# Patient Record
Sex: FEMALE | Race: OTHER | NOT HISPANIC OR LATINO | URBAN - METROPOLITAN AREA
[De-identification: names, ages, dates, MRNs, and addresses within clinical notes are randomized per-mention and may not be internally consistent; named-entity substitution may affect disease eponyms.]

---

## 2021-01-01 ENCOUNTER — OUTPATIENT (OUTPATIENT)
Dept: OUTPATIENT SERVICES | Facility: HOSPITAL | Age: 56
LOS: 1 days | End: 2021-01-01
Payer: MEDICAID

## 2021-01-07 ENCOUNTER — EMERGENCY (EMERGENCY)
Facility: HOSPITAL | Age: 56
LOS: 1 days | Discharge: HOME | End: 2021-01-07
Attending: EMERGENCY MEDICINE | Admitting: EMERGENCY MEDICINE
Payer: MEDICAID

## 2021-01-07 VITALS
OXYGEN SATURATION: 98 % | SYSTOLIC BLOOD PRESSURE: 119 MMHG | RESPIRATION RATE: 16 BRPM | TEMPERATURE: 97 F | DIASTOLIC BLOOD PRESSURE: 78 MMHG | HEART RATE: 75 BPM

## 2021-01-07 VITALS
TEMPERATURE: 98 F | OXYGEN SATURATION: 99 % | DIASTOLIC BLOOD PRESSURE: 79 MMHG | RESPIRATION RATE: 17 BRPM | HEIGHT: 65 IN | SYSTOLIC BLOOD PRESSURE: 149 MMHG | WEIGHT: 187.39 LBS | HEART RATE: 99 BPM

## 2021-01-07 DIAGNOSIS — R51.9 HEADACHE, UNSPECIFIED: ICD-10-CM

## 2021-01-07 DIAGNOSIS — U07.1 COVID-19: ICD-10-CM

## 2021-01-07 DIAGNOSIS — L59.0 ERYTHEMA AB IGNE [DERMATITIS AB IGNE]: ICD-10-CM

## 2021-01-07 DIAGNOSIS — B34.9 VIRAL INFECTION, UNSPECIFIED: ICD-10-CM

## 2021-01-07 LAB
ALBUMIN SERPL ELPH-MCNC: 3.8 G/DL — SIGNIFICANT CHANGE UP (ref 3.4–5)
ALP SERPL-CCNC: 92 U/L — SIGNIFICANT CHANGE UP (ref 40–120)
ALT FLD-CCNC: 29 U/L — SIGNIFICANT CHANGE UP (ref 12–42)
ANION GAP SERPL CALC-SCNC: 6 MMOL/L — LOW (ref 9–16)
APTT BLD: 35.5 SEC — SIGNIFICANT CHANGE UP (ref 27.5–35.5)
AST SERPL-CCNC: 24 U/L — SIGNIFICANT CHANGE UP (ref 15–37)
BASOPHILS # BLD AUTO: 0 K/UL — SIGNIFICANT CHANGE UP (ref 0–0.2)
BASOPHILS NFR BLD AUTO: 0 % — SIGNIFICANT CHANGE UP (ref 0–2)
BILIRUB SERPL-MCNC: 0.5 MG/DL — SIGNIFICANT CHANGE UP (ref 0.2–1.2)
BUN SERPL-MCNC: 13 MG/DL — SIGNIFICANT CHANGE UP (ref 7–23)
CALCIUM SERPL-MCNC: 9.3 MG/DL — SIGNIFICANT CHANGE UP (ref 8.5–10.5)
CHLORIDE SERPL-SCNC: 105 MMOL/L — SIGNIFICANT CHANGE UP (ref 96–108)
CO2 SERPL-SCNC: 28 MMOL/L — SIGNIFICANT CHANGE UP (ref 22–31)
CREAT SERPL-MCNC: 0.83 MG/DL — SIGNIFICANT CHANGE UP (ref 0.5–1.3)
EOSINOPHIL # BLD AUTO: 0.29 K/UL — SIGNIFICANT CHANGE UP (ref 0–0.5)
EOSINOPHIL NFR BLD AUTO: 3 % — SIGNIFICANT CHANGE UP (ref 0–6)
GLUCOSE SERPL-MCNC: 117 MG/DL — HIGH (ref 70–99)
HCT VFR BLD CALC: 35.7 % — SIGNIFICANT CHANGE UP (ref 34.5–45)
HGB BLD-MCNC: 11.7 G/DL — SIGNIFICANT CHANGE UP (ref 11.5–15.5)
INR BLD: 1.1 — SIGNIFICANT CHANGE UP (ref 0.88–1.16)
LYMPHOCYTES # BLD AUTO: 3.33 K/UL — HIGH (ref 1–3.3)
LYMPHOCYTES # BLD AUTO: 35 % — SIGNIFICANT CHANGE UP (ref 13–44)
MANUAL SMEAR VERIFICATION: SIGNIFICANT CHANGE UP
MCHC RBC-ENTMCNC: 27.3 PG — SIGNIFICANT CHANGE UP (ref 27–34)
MCHC RBC-ENTMCNC: 32.8 GM/DL — SIGNIFICANT CHANGE UP (ref 32–36)
MCV RBC AUTO: 83.2 FL — SIGNIFICANT CHANGE UP (ref 80–100)
MONOCYTES # BLD AUTO: 0.67 K/UL — SIGNIFICANT CHANGE UP (ref 0–0.9)
MONOCYTES NFR BLD AUTO: 7 % — SIGNIFICANT CHANGE UP (ref 2–14)
NEUTROPHILS # BLD AUTO: 4.47 K/UL — SIGNIFICANT CHANGE UP (ref 1.8–7.4)
NEUTROPHILS NFR BLD AUTO: 43 % — SIGNIFICANT CHANGE UP (ref 43–77)
NEUTS BAND # BLD: 4 % — SIGNIFICANT CHANGE UP (ref 0–8)
NRBC # BLD: 0 /100 — SIGNIFICANT CHANGE UP (ref 0–0)
NRBC # BLD: SIGNIFICANT CHANGE UP /100 WBCS (ref 0–0)
PLAT MORPH BLD: NORMAL — SIGNIFICANT CHANGE UP
PLATELET # BLD AUTO: 233 K/UL — SIGNIFICANT CHANGE UP (ref 150–400)
POTASSIUM SERPL-MCNC: 3.8 MMOL/L — SIGNIFICANT CHANGE UP (ref 3.5–5.3)
POTASSIUM SERPL-SCNC: 3.8 MMOL/L — SIGNIFICANT CHANGE UP (ref 3.5–5.3)
PROT SERPL-MCNC: 7.5 G/DL — SIGNIFICANT CHANGE UP (ref 6.4–8.2)
PROTHROM AB SERPL-ACNC: 12.9 SEC — SIGNIFICANT CHANGE UP (ref 10.6–13.6)
RBC # BLD: 4.29 M/UL — SIGNIFICANT CHANGE UP (ref 3.8–5.2)
RBC # FLD: 14.5 % — SIGNIFICANT CHANGE UP (ref 10.3–14.5)
RBC BLD AUTO: NORMAL — SIGNIFICANT CHANGE UP
S PYO AG SPEC QL IA: NEGATIVE — SIGNIFICANT CHANGE UP
SODIUM SERPL-SCNC: 139 MMOL/L — SIGNIFICANT CHANGE UP (ref 132–145)
VARIANT LYMPHS # BLD: 8 % — HIGH (ref 0–6)
WBC # BLD: 9.52 K/UL — SIGNIFICANT CHANGE UP (ref 3.8–10.5)
WBC # FLD AUTO: 9.52 K/UL — SIGNIFICANT CHANGE UP (ref 3.8–10.5)

## 2021-01-07 RX ORDER — IBUPROFEN 200 MG
1 TABLET ORAL
Qty: 20 | Refills: 0
Start: 2021-01-07 | End: 2021-01-11

## 2021-01-07 RX ORDER — DEXAMETHASONE 0.5 MG/5ML
6 ELIXIR ORAL ONCE
Refills: 0 | Status: COMPLETED | OUTPATIENT
Start: 2021-01-07 | End: 2021-01-07

## 2021-01-07 RX ORDER — IBUPROFEN 200 MG
800 TABLET ORAL ONCE
Refills: 0 | Status: COMPLETED | OUTPATIENT
Start: 2021-01-07 | End: 2021-01-07

## 2021-01-07 RX ORDER — DEXAMETHASONE 0.5 MG/5ML
1 ELIXIR ORAL
Qty: 10 | Refills: 0
Start: 2021-01-07 | End: 2021-01-16

## 2021-01-07 RX ADMIN — Medication 6 MILLIGRAM(S): at 08:31

## 2021-01-07 RX ADMIN — Medication 800 MILLIGRAM(S): at 08:30

## 2021-01-07 NOTE — ED PROVIDER NOTE - CLINICAL SUMMARY MEDICAL DECISION MAKING FREE TEXT BOX
54 y/o F with PMHx of asthma, HTN, and non-IDDM presenting with intermittent HA and persistent L sided neck pain and sore throat since being diagnosed with COVID-19 on 12/7/20. Exam is remarkable for some L sided cervical lymphadenopathy and some L sided paraspinal muscular tenderness to palpation. Pt otherwise appears well and is without CP or SOB at this time. No conversational dyspnea. Speaking in full sentences. Pt does not appear toxic. No bacterial sores. Suspect symptoms are due to lingering COVID-19. Plan for repeat COVID-19 swab, basic labs, and repeat CXR. Will start on Decadron and Ibuprofen. Will reassess clinically.

## 2021-01-07 NOTE — ED PROVIDER NOTE - PHYSICAL EXAMINATION
VITAL SIGNS: I have reviewed nursing notes and confirm.  CONSTITUTIONAL: Well-developed; well-nourished; in no acute distress.  SKIN: Skin exam is warm and dry, no acute rash.  HEAD: Normocephalic; atraumatic.  EYES: PERRL, EOM intact; conjunctiva and sclera clear.  ENT: No nasal discharge; airway clear; No tonsilar enlargement. No exudates or redness.   NECK: Supple; non tender.  CARD: S1, S2 normal; no murmurs, gallops, or rubs. Regular rate and rhythm.  RESP: Unlabored. No wheezes, rales or rhonchi.  ABD: soft; non-distended; non-tender  MSK: Some left sided paraspinal muscular TTP with no midline or bony tenderness; Normal ROM. No cyanosis or edema. Distal pulses intact  LYMPH: Some L sided cervical  lymphadenopathy  NEURO: Alert, oriented. Grossly unremarkable.  PSYCH: Cooperative, appropriate.

## 2021-01-07 NOTE — ED PROVIDER NOTE - OBJECTIVE STATEMENT
56 y/o F with PMHx of asthma, HTN, and non-IDDM (recently diagnosed with COVID-19 on 12/7/20) presents to the ED for intermittent HA that is mostly behind the L eye and L sided neck pain that is sore to touch. Pt reports the symptoms have been occurring since her Dx of COVID-19 on 12/7/20. She also reports having dry coughs and a sore throat. Pt recently completed a round of Prednisone 1 week ago for an asthma flare-up and a course of Doxycycline for sinusitis (last pill yesterday). Pt denies CP, SOB, numbness, tingling, weakness, GI /  symptoms, and trauma. Pt is mostly complaining of persistent sore throat and L sided neck pain. Pt also endorses she does not typically suffer from HA.

## 2021-01-07 NOTE — ED ADULT TRIAGE NOTE - CHIEF COMPLAINT QUOTE
Generalized neck pain and left eye pain x3 days. PMH HTN, Asthma, HDL, NIDDM. Tested positive for COVID 12/07/20. No fever, chills or SOB.

## 2021-01-07 NOTE — ED PROVIDER NOTE - PATIENT PORTAL LINK FT
You can access the FollowMyHealth Patient Portal offered by [facility] by registering at the following website: [email or web address]. By joining [employer]’s FollowMyHealth portal, you will also be able to view your health information using other applications (apps) compatible with our system.

## 2021-01-07 NOTE — ED PROVIDER NOTE - NSFOLLOWUPINSTRUCTIONS_ED_ALL_ED_FT
Your test results may take 1-3 days. You will get a text/email.  Please check the patient online portal (Dre and website) for results. You can create a portal account at [email or web address] [facility] [place]. If you have old records with [address] or [employer] [facility]  or encounter any difficulties with us you will need to call the HELP line to merge results [phone] (Mon-Fri 8a-5p).    Please follow the instructions on provided coronavirus discharge educational forms and if needed self quarantine for 14 days.     If you test positive for COVID 19:    1. STAY HOME for 14 DAYS  2. Minimize Human contact to ONLY ESSENTIAL  3. Every time you wash your hands, sing the HAPPY BIRTHDAY Song so you know you're washing long enough.  Make sure to scrub the webspace between your fingers.  4. DRINK 1-3 Liters of fluids day x at least 5 days.  To remain hydrated. Your fatigue, lightheadedness, and body aches will decrease and your fever has a better chance of breaking if you are well hydrated.    5. For your Fever and Body aches takes Tylenol 650-100mg every 4-6h (max 4000mg/day). Try not to use ibuprofen, aspirin or naproxen (Advil, Motrin or Aleve) as these may worsen Coronavirus infection.  6. Use an inhaler for mild shortness of breath and cough  7. RETURN TO THE ER IMMEDIATELY IF YOU HAVE WORSENING SHORTNESS OF BREATH  8. TAKE THE FOLLOWING SUPPLEMENTS DAILY.        VITAMIN C 1000MG ONCE DAILY.        VITAMIN D 200IU ONCE DAILY.        ZINC 50MG ONCE DAILY.

## 2021-01-08 LAB — SARS-COV-2 RNA SPEC QL NAA+PROBE: DETECTED

## 2021-01-10 ENCOUNTER — EMERGENCY (EMERGENCY)
Facility: HOSPITAL | Age: 56
LOS: 1 days | Discharge: HOME | End: 2021-01-10
Attending: EMERGENCY MEDICINE | Admitting: EMERGENCY MEDICINE
Payer: MEDICAID

## 2021-01-10 VITALS
TEMPERATURE: 98 F | HEART RATE: 95 BPM | HEIGHT: 65 IN | DIASTOLIC BLOOD PRESSURE: 98 MMHG | OXYGEN SATURATION: 95 % | WEIGHT: 160.06 LBS | RESPIRATION RATE: 18 BRPM | SYSTOLIC BLOOD PRESSURE: 175 MMHG

## 2021-01-10 DIAGNOSIS — R06.02 SHORTNESS OF BREATH: ICD-10-CM

## 2021-01-10 DIAGNOSIS — E11.8 TYPE 2 DIABETES MELLITUS WITH UNSPECIFIED COMPLICATIONS: ICD-10-CM

## 2021-01-10 DIAGNOSIS — I10 ESSENTIAL (PRIMARY) HYPERTENSION: ICD-10-CM

## 2021-01-10 DIAGNOSIS — U07.1 COVID-19: ICD-10-CM

## 2021-01-10 RX ORDER — ALBUTEROL 90 UG/1
2 AEROSOL, METERED ORAL ONCE
Refills: 0 | Status: COMPLETED | OUTPATIENT
Start: 2021-01-10 | End: 2021-01-10

## 2021-01-10 RX ADMIN — ALBUTEROL 2 PUFF(S): 90 AEROSOL, METERED ORAL at 17:04

## 2021-01-10 NOTE — ED PROVIDER NOTE - OBJECTIVE STATEMENT
56 y/o F with PMHx of asthma, HTN, and non-IDDM (recently diagnosed with COVID-19 on 12/7/20), seen 3 days ago in ED for lingering dry cough, HA, L sided MSK neck pain, started on decadron, ibuprofen, continued albuterol use, today BIBEMS for an episode of SOB in which pt woke from a nap and felt as though she couldn't catch her breath. No CP. No n/v. No exertional component. Sx improved by the time EMS arrived, feeling in normal state of health in ED at time of interview. Of note, pt continues to test positive for COVID. No recent fever/chills. HA and neck pain improving. Denies neck stiffness. No focal numbness/tingling/weakness. Admits that she is under a great deal of stress at home and hasn't been sleeping well for several days.

## 2021-01-10 NOTE — ED PROVIDER NOTE - PATIENT PORTAL LINK FT
You can access the FollowMyHealth Patient Portal offered by [facility] by registering at the following website: [email or web address]. By joining [employer]’s FollowMyHealth portal, you will also be able to view your health information using other applications (apps) compatible with our system.

## 2021-01-10 NOTE — ED PROVIDER NOTE - CLINICAL SUMMARY MEDICAL DECISION MAKING FREE TEXT BOX
Feeling well in ED, likely psychosomatic component to discrete episode. Otherwise pt's breathing has been responding well to continued steroid and bronchodilator therapy. Given new inhaler. Ambulated in ED w/no hypoxia or difficulty breathing. d/c home with return precautions and f/u with PMD.

## 2021-01-10 NOTE — ED ADULT TRIAGE NOTE - CHIEF COMPLAINT QUOTE
pt reports running out of her asthma pump. Reports having shortness of breath. Appears in no distress. As per EMS pt walked down 19 floors to get to them. Speaking in full sentences. Has cough. pt reports running out of her asthma pump. Reports having shortness of breath. Appears in no distress. As per EMS pt came down 19 floors to get to them. Speaking in full sentences. Has cough.

## 2021-01-10 NOTE — ED ADULT NURSE NOTE - CHIEF COMPLAINT QUOTE
pt reports running out of her asthma pump. Reports having shortness of breath. Appears in no distress. As per EMS pt came down 19 floors to get to them. Speaking in full sentences. Has cough.

## 2021-01-11 PROBLEM — J45.909 UNSPECIFIED ASTHMA, UNCOMPLICATED: Chronic | Status: ACTIVE | Noted: 2021-01-07

## 2021-01-11 PROBLEM — I10 ESSENTIAL (PRIMARY) HYPERTENSION: Chronic | Status: ACTIVE | Noted: 2021-01-07

## 2021-01-11 PROBLEM — E11.9 TYPE 2 DIABETES MELLITUS WITHOUT COMPLICATIONS: Chronic | Status: ACTIVE | Noted: 2021-01-07

## 2021-01-12 ENCOUNTER — EMERGENCY (EMERGENCY)
Facility: HOSPITAL | Age: 56
LOS: 1 days | Discharge: HOME | End: 2021-01-12
Admitting: EMERGENCY MEDICINE
Payer: MEDICAID

## 2021-01-12 VITALS
TEMPERATURE: 98 F | SYSTOLIC BLOOD PRESSURE: 152 MMHG | RESPIRATION RATE: 18 BRPM | HEART RATE: 86 BPM | OXYGEN SATURATION: 96 % | DIASTOLIC BLOOD PRESSURE: 92 MMHG

## 2021-01-12 VITALS
DIASTOLIC BLOOD PRESSURE: 92 MMHG | TEMPERATURE: 98 F | SYSTOLIC BLOOD PRESSURE: 162 MMHG | HEIGHT: 65 IN | OXYGEN SATURATION: 96 % | RESPIRATION RATE: 18 BRPM | HEART RATE: 84 BPM

## 2021-01-12 DIAGNOSIS — Z98.51 TUBAL LIGATION STATUS: Chronic | ICD-10-CM

## 2021-01-12 DIAGNOSIS — J98.01 ACUTE BRONCHOSPASM: ICD-10-CM

## 2021-01-12 DIAGNOSIS — R06.09 OTHER FORMS OF DYSPNEA: ICD-10-CM

## 2021-01-12 DIAGNOSIS — E87.6 HYPOKALEMIA: ICD-10-CM

## 2021-01-12 LAB
ALBUMIN SERPL ELPH-MCNC: 3.7 G/DL — SIGNIFICANT CHANGE UP (ref 3.4–5)
ALP SERPL-CCNC: 88 U/L — SIGNIFICANT CHANGE UP (ref 40–120)
ALT FLD-CCNC: 107 U/L — HIGH (ref 12–42)
ANION GAP SERPL CALC-SCNC: 11 MMOL/L — SIGNIFICANT CHANGE UP (ref 9–16)
APPEARANCE UR: CLEAR — SIGNIFICANT CHANGE UP
AST SERPL-CCNC: 27 U/L — SIGNIFICANT CHANGE UP (ref 15–37)
BASOPHILS # BLD AUTO: 0 K/UL — SIGNIFICANT CHANGE UP (ref 0–0.2)
BASOPHILS NFR BLD AUTO: 0 % — SIGNIFICANT CHANGE UP (ref 0–2)
BILIRUB SERPL-MCNC: 0.3 MG/DL — SIGNIFICANT CHANGE UP (ref 0.2–1.2)
BILIRUB UR-MCNC: NEGATIVE — SIGNIFICANT CHANGE UP
BUN SERPL-MCNC: 14 MG/DL — SIGNIFICANT CHANGE UP (ref 7–23)
CALCIUM SERPL-MCNC: 8.8 MG/DL — SIGNIFICANT CHANGE UP (ref 8.5–10.5)
CHLORIDE SERPL-SCNC: 103 MMOL/L — SIGNIFICANT CHANGE UP (ref 96–108)
CO2 SERPL-SCNC: 25 MMOL/L — SIGNIFICANT CHANGE UP (ref 22–31)
COLOR SPEC: YELLOW — SIGNIFICANT CHANGE UP
CREAT SERPL-MCNC: 0.89 MG/DL — SIGNIFICANT CHANGE UP (ref 0.5–1.3)
D DIMER BLD IA.RAPID-MCNC: <187 NG/ML DDU — SIGNIFICANT CHANGE UP
DIFF PNL FLD: NEGATIVE — SIGNIFICANT CHANGE UP
EOSINOPHIL # BLD AUTO: 0 K/UL — SIGNIFICANT CHANGE UP (ref 0–0.5)
EOSINOPHIL NFR BLD AUTO: 0 % — SIGNIFICANT CHANGE UP (ref 0–6)
GLUCOSE SERPL-MCNC: 119 MG/DL — HIGH (ref 70–99)
GLUCOSE UR QL: NEGATIVE — SIGNIFICANT CHANGE UP
HCG UR QL: NEGATIVE — SIGNIFICANT CHANGE UP
HCT VFR BLD CALC: 37.1 % — SIGNIFICANT CHANGE UP (ref 34.5–45)
HGB BLD-MCNC: 12.1 G/DL — SIGNIFICANT CHANGE UP (ref 11.5–15.5)
KETONES UR-MCNC: NEGATIVE — SIGNIFICANT CHANGE UP
LEUKOCYTE ESTERASE UR-ACNC: ABNORMAL
LYMPHOCYTES # BLD AUTO: 35 % — SIGNIFICANT CHANGE UP (ref 13–44)
LYMPHOCYTES # BLD AUTO: 5.45 K/UL — HIGH (ref 1–3.3)
MAGNESIUM SERPL-MCNC: 1.8 MG/DL — SIGNIFICANT CHANGE UP (ref 1.6–2.6)
MCHC RBC-ENTMCNC: 27.4 PG — SIGNIFICANT CHANGE UP (ref 27–34)
MCHC RBC-ENTMCNC: 32.6 GM/DL — SIGNIFICANT CHANGE UP (ref 32–36)
MCV RBC AUTO: 83.9 FL — SIGNIFICANT CHANGE UP (ref 80–100)
MONOCYTES # BLD AUTO: 0.93 K/UL — HIGH (ref 0–0.9)
MONOCYTES NFR BLD AUTO: 6 % — SIGNIFICANT CHANGE UP (ref 2–14)
NEUTROPHILS # BLD AUTO: 8.88 K/UL — HIGH (ref 1.8–7.4)
NEUTROPHILS NFR BLD AUTO: 57 % — SIGNIFICANT CHANGE UP (ref 43–77)
NITRITE UR-MCNC: NEGATIVE — SIGNIFICANT CHANGE UP
NRBC # BLD: SIGNIFICANT CHANGE UP /100 WBCS (ref 0–0)
NT-PROBNP SERPL-SCNC: 48 PG/ML — SIGNIFICANT CHANGE UP
PCO2 BLDV: 37 MMHG — LOW (ref 41–51)
PH BLDV: 7.42 — SIGNIFICANT CHANGE UP (ref 7.32–7.43)
PH UR: 6.5 — SIGNIFICANT CHANGE UP (ref 5–8)
PLATELET # BLD AUTO: 246 K/UL — SIGNIFICANT CHANGE UP (ref 150–400)
PO2 BLDV: 90 MMHG — HIGH (ref 35–40)
POTASSIUM SERPL-MCNC: 3.4 MMOL/L — LOW (ref 3.5–5.3)
POTASSIUM SERPL-SCNC: 3.4 MMOL/L — LOW (ref 3.5–5.3)
PROT SERPL-MCNC: 7.1 G/DL — SIGNIFICANT CHANGE UP (ref 6.4–8.2)
PROT UR-MCNC: NEGATIVE MG/DL — SIGNIFICANT CHANGE UP
RBC # BLD: 4.42 M/UL — SIGNIFICANT CHANGE UP (ref 3.8–5.2)
RBC # FLD: 14.8 % — HIGH (ref 10.3–14.5)
SAO2 % BLDV: 97 % — SIGNIFICANT CHANGE UP
SODIUM SERPL-SCNC: 139 MMOL/L — SIGNIFICANT CHANGE UP (ref 132–145)
SP GR SPEC: <=1.005 — SIGNIFICANT CHANGE UP (ref 1–1.03)
TROPONIN I SERPL-MCNC: <0.017 NG/ML — LOW (ref 0.02–0.06)
UROBILINOGEN FLD QL: 0.2 E.U./DL — SIGNIFICANT CHANGE UP
WBC # BLD: 15.58 K/UL — HIGH (ref 3.8–10.5)
WBC # FLD AUTO: 15.58 K/UL — HIGH (ref 3.8–10.5)

## 2021-01-12 RX ORDER — POTASSIUM CHLORIDE 20 MEQ
40 PACKET (EA) ORAL ONCE
Refills: 0 | Status: COMPLETED | OUTPATIENT
Start: 2021-01-12 | End: 2021-01-12

## 2021-01-12 RX ADMIN — Medication 500 MILLIGRAM(S): at 23:43

## 2021-01-12 RX ADMIN — Medication 40 MILLIEQUIVALENT(S): at 23:46

## 2021-01-12 NOTE — ED PROVIDER NOTE - CARE PROVIDER_API CALL
Tomás Mojica)  Critical Care Medicine; Internal Medicine; Pulmonary Disease  [address] [place] [address]  Phone: [phone]  Fax: [phone]  Follow Up Time:

## 2021-01-12 NOTE — ED ADULT TRIAGE NOTE - CHIEF COMPLAINT QUOTE
Pt BIBA c/o difficulty breathing for 1 wk. PMH of asthma and HTN. Pt taking albuterol daily with no relief. Pt speaking in complete sentences w/o difficulty, SpO2 97% on RA.

## 2021-01-12 NOTE — ED PROVIDER NOTE - CLINICAL SUMMARY MEDICAL DECISION MAKING FREE TEXT BOX
pt with COVID infection approximately 3 wks ago, sx improved but noted lingering sob with increased anxiety attacks with insomnia, AFVSS and non-toxic appearing, labs with mild hypokalemia, s/p oral supplementation, wbc elevated at 15K, with reactive lymphocytes, likely viral vs steroid induced (pt received couple doses of decadron during prior ER visits), no s/s of bacterial infection noted elsewhere, CT chest with no acute intrathoracic pathology, results, ddx, and f/u plans discussed with pt at bedside, d/c'd home to f/u with PMD and pulm, strict return precautions discussed, prompt return to ER for any worsening or new sx, pt verbalized understanding.

## 2021-01-12 NOTE — ED PROVIDER NOTE - CARE PROVIDERS DIRECT ADDRESSES
[email or web address]

## 2021-01-12 NOTE — ED PROVIDER NOTE - PATIENT PORTAL LINK FT
You can access the FollowMyHealth Patient Portal offered by [facility] by registering at the following website: [email or web address]. By joining [employer]’s FollowMyHealth portal, you will also be able to view your health information using other applications (apps) compatible with our system.

## 2021-01-12 NOTE — ED PROVIDER NOTE - CARE PLAN
Principal Discharge DX:	Dyspnea  Secondary Diagnosis:	Bronchospastic airway disease  Secondary Diagnosis:	Hypokalemia

## 2021-01-12 NOTE — ED PROVIDER NOTE - PHYSICAL EXAMINATION
Vital Signs - nursing notes reviewed and confirmed  Gen - WDWN F, NAD, comfortable and non-toxic appearing, speaking in full sentences   Skin - warm, dry, intact  HEENT - AT/NC, PERRL, EOMI, no conjunctival injection, moist oral mucosa, TM intact b/l with good cone of lights, o/p clear with no erythema, edema, or exudate, uvula midline, airway patent, neck supple and NT, FROM, no JVD or carotid bruits b/l, no palpable nodes  CV - S1S2, R/R/R, no m/r/g   Resp - respiration non-labored, CTAB, symmetric bs b/l, no r/r/w, no tripoding or accessory muscle use   GI - NABS, soft, ND, NT, no rebound or guarding, no CVAT b/l   MS - w/w/p, no c/c/e, calves supple and NT, distal pulses symmetric b/l, brisk cap refills, +SILT  Neuro - AxOx3, no focal neuro deficits, CN II-XII grossly intact, cerebellar function intact, ambulatory without gait disturbance

## 2021-01-12 NOTE — ED PROVIDER NOTE - OBJECTIVE STATEMENT
54 yo F with PMHx of asthma, no h/o intubation, baseline peak flow unknown, recently dx with COVID 12/07/20, and seen here twice in the past week for SOB/asthma, s/p albuterol and decadron and d/c'd, NIDDM, HLD, presenting c/o SOB, chest tightness and shaking sensation.  Pt reports having increased "anxiety attacks after dx with covid, waking up in the middle of the night feeling suffocated with subjective sob, chest tightness and palpitations." Was seen by PMD a couple of days ago and given "asthma medication" and no other interventions noted. Reports having similar sensation last night and earlier today as well. Denies fever, chills, wheezing, hemoptysis, CP, orthopnea, peripheral edema, stridors, focal weakness, sore throat, tinnitus, HA, dizziness, N/V/D/C, abdominal pain, change in urinary/bowel function, night sweats, and malaise.

## 2021-01-12 NOTE — ED ADULT NURSE NOTE - NS_SISCREENINGSR_GEN_ALL_ED
Change warfarin dose to 5 mg Saturday and 2.5 mg Mon, Tue, Wed, Thurs, Fri, Sun (20 mg/week). Recheck INR in 10-14 days.     
Negative

## 2021-01-12 NOTE — ED ADULT NURSE NOTE - DOES PATIENT HAVE ADVANCE DIRECTIVE
Spoke with patient on phone. Patient does not have copy of bridge plan instructions with her, patient is at home post procedure on this date. Verbally given patient warfarin and Lovenox instructions as outlined in bridge plan instructions. Patient read back correct dose and directions. Next INR 4/3/18 as scheduled.    No

## 2021-01-13 RX ORDER — LANOLIN ALCOHOL/MO/W.PET/CERES
1 CREAM (GRAM) TOPICAL
Qty: 15 | Refills: 0
Start: 2021-01-13

## 2021-01-13 RX ORDER — ALBUTEROL 90 UG/1
1 AEROSOL, METERED ORAL ONCE
Refills: 0 | Status: COMPLETED | OUTPATIENT
Start: 2021-01-13 | End: 2021-01-13

## 2021-01-13 RX ORDER — MULTIVIT-MIN/FERROUS GLUCONATE 9 MG/15 ML
1 LIQUID (ML) ORAL
Qty: 15 | Refills: 0
Start: 2021-01-13

## 2021-01-13 RX ADMIN — ALBUTEROL 1 PUFF(S): 90 AEROSOL, METERED ORAL at 00:58

## 2021-01-19 ENCOUNTER — EMERGENCY (EMERGENCY)
Facility: HOSPITAL | Age: 56
LOS: 1 days | Discharge: HOME | End: 2021-01-19
Admitting: EMERGENCY MEDICINE
Payer: MEDICAID

## 2021-01-19 VITALS
SYSTOLIC BLOOD PRESSURE: 124 MMHG | TEMPERATURE: 98 F | DIASTOLIC BLOOD PRESSURE: 80 MMHG | WEIGHT: 179.9 LBS | HEART RATE: 86 BPM | OXYGEN SATURATION: 99 % | HEIGHT: 65 IN | RESPIRATION RATE: 18 BRPM

## 2021-01-19 VITALS
OXYGEN SATURATION: 98 % | RESPIRATION RATE: 17 BRPM | DIASTOLIC BLOOD PRESSURE: 75 MMHG | TEMPERATURE: 98 F | SYSTOLIC BLOOD PRESSURE: 126 MMHG | HEART RATE: 70 BPM

## 2021-01-19 DIAGNOSIS — L98.8 OTHER SPECIFIED DISORDERS OF THE SKIN AND SUBCUTANEOUS TISSUE: ICD-10-CM

## 2021-01-19 DIAGNOSIS — U07.1 COVID-19: ICD-10-CM

## 2021-01-19 DIAGNOSIS — Z98.51 TUBAL LIGATION STATUS: Chronic | ICD-10-CM

## 2021-01-19 DIAGNOSIS — R10.13 EPIGASTRIC PAIN: ICD-10-CM

## 2021-01-19 PROBLEM — E78.5 HYPERLIPIDEMIA, UNSPECIFIED: Chronic | Status: ACTIVE | Noted: 2021-01-12

## 2021-01-19 LAB
ALBUMIN SERPL ELPH-MCNC: 3.7 G/DL — SIGNIFICANT CHANGE UP (ref 3.4–5)
ALP SERPL-CCNC: 82 U/L — SIGNIFICANT CHANGE UP (ref 40–120)
ALT FLD-CCNC: 64 U/L — HIGH (ref 12–42)
ANION GAP SERPL CALC-SCNC: 12 MMOL/L — SIGNIFICANT CHANGE UP (ref 9–16)
APPEARANCE UR: CLEAR — SIGNIFICANT CHANGE UP
AST SERPL-CCNC: 39 U/L — HIGH (ref 15–37)
BASOPHILS # BLD AUTO: 0.04 K/UL — SIGNIFICANT CHANGE UP (ref 0–0.2)
BASOPHILS NFR BLD AUTO: 0.5 % — SIGNIFICANT CHANGE UP (ref 0–2)
BILIRUB SERPL-MCNC: 0.3 MG/DL — SIGNIFICANT CHANGE UP (ref 0.2–1.2)
BILIRUB UR-MCNC: NEGATIVE — SIGNIFICANT CHANGE UP
BUN SERPL-MCNC: 8 MG/DL — SIGNIFICANT CHANGE UP (ref 7–23)
CALCIUM SERPL-MCNC: 8.9 MG/DL — SIGNIFICANT CHANGE UP (ref 8.5–10.5)
CHLORIDE SERPL-SCNC: 105 MMOL/L — SIGNIFICANT CHANGE UP (ref 96–108)
CO2 SERPL-SCNC: 22 MMOL/L — SIGNIFICANT CHANGE UP (ref 22–31)
COLOR SPEC: YELLOW — SIGNIFICANT CHANGE UP
CREAT SERPL-MCNC: 0.71 MG/DL — SIGNIFICANT CHANGE UP (ref 0.5–1.3)
DIFF PNL FLD: NEGATIVE — SIGNIFICANT CHANGE UP
EOSINOPHIL # BLD AUTO: 0.06 K/UL — SIGNIFICANT CHANGE UP (ref 0–0.5)
EOSINOPHIL NFR BLD AUTO: 0.7 % — SIGNIFICANT CHANGE UP (ref 0–6)
GLUCOSE SERPL-MCNC: 146 MG/DL — HIGH (ref 70–99)
GLUCOSE UR QL: NEGATIVE — SIGNIFICANT CHANGE UP
HCT VFR BLD CALC: 36.7 % — SIGNIFICANT CHANGE UP (ref 34.5–45)
HGB BLD-MCNC: 11.8 G/DL — SIGNIFICANT CHANGE UP (ref 11.5–15.5)
IMM GRANULOCYTES NFR BLD AUTO: 0.3 % — SIGNIFICANT CHANGE UP (ref 0–1.5)
KETONES UR-MCNC: NEGATIVE — SIGNIFICANT CHANGE UP
LEUKOCYTE ESTERASE UR-ACNC: NEGATIVE — SIGNIFICANT CHANGE UP
LIDOCAIN IGE QN: 108 U/L — SIGNIFICANT CHANGE UP (ref 73–393)
LYMPHOCYTES # BLD AUTO: 2.53 K/UL — SIGNIFICANT CHANGE UP (ref 1–3.3)
LYMPHOCYTES # BLD AUTO: 29.2 % — SIGNIFICANT CHANGE UP (ref 13–44)
MCHC RBC-ENTMCNC: 27.3 PG — SIGNIFICANT CHANGE UP (ref 27–34)
MCHC RBC-ENTMCNC: 32.2 GM/DL — SIGNIFICANT CHANGE UP (ref 32–36)
MCV RBC AUTO: 85 FL — SIGNIFICANT CHANGE UP (ref 80–100)
MONOCYTES # BLD AUTO: 0.66 K/UL — SIGNIFICANT CHANGE UP (ref 0–0.9)
MONOCYTES NFR BLD AUTO: 7.6 % — SIGNIFICANT CHANGE UP (ref 2–14)
NEUTROPHILS # BLD AUTO: 5.34 K/UL — SIGNIFICANT CHANGE UP (ref 1.8–7.4)
NEUTROPHILS NFR BLD AUTO: 61.7 % — SIGNIFICANT CHANGE UP (ref 43–77)
NITRITE UR-MCNC: NEGATIVE — SIGNIFICANT CHANGE UP
NRBC # BLD: 0 /100 WBCS — SIGNIFICANT CHANGE UP (ref 0–0)
PH UR: 6 — SIGNIFICANT CHANGE UP (ref 5–8)
PLATELET # BLD AUTO: 214 K/UL — SIGNIFICANT CHANGE UP (ref 150–400)
POTASSIUM SERPL-MCNC: 3.7 MMOL/L — SIGNIFICANT CHANGE UP (ref 3.5–5.3)
POTASSIUM SERPL-SCNC: 3.7 MMOL/L — SIGNIFICANT CHANGE UP (ref 3.5–5.3)
PROT SERPL-MCNC: 7.2 G/DL — SIGNIFICANT CHANGE UP (ref 6.4–8.2)
PROT UR-MCNC: NEGATIVE MG/DL — SIGNIFICANT CHANGE UP
RBC # BLD: 4.32 M/UL — SIGNIFICANT CHANGE UP (ref 3.8–5.2)
RBC # FLD: 14.7 % — HIGH (ref 10.3–14.5)
SODIUM SERPL-SCNC: 139 MMOL/L — SIGNIFICANT CHANGE UP (ref 132–145)
SP GR SPEC: 1.02 — SIGNIFICANT CHANGE UP (ref 1–1.03)
UROBILINOGEN FLD QL: 0.2 E.U./DL — SIGNIFICANT CHANGE UP
WBC # BLD: 8.66 K/UL — SIGNIFICANT CHANGE UP (ref 3.8–10.5)
WBC # FLD AUTO: 8.66 K/UL — SIGNIFICANT CHANGE UP (ref 3.8–10.5)

## 2021-01-19 RX ORDER — MORPHINE SULFATE 50 MG/1
2 CAPSULE, EXTENDED RELEASE ORAL ONCE
Refills: 0 | Status: DISCONTINUED | OUTPATIENT
Start: 2021-01-19 | End: 2021-01-19

## 2021-01-19 RX ADMIN — Medication 20 MILLIGRAM(S): at 14:38

## 2021-01-19 RX ADMIN — MORPHINE SULFATE 2 MILLIGRAM(S): 50 CAPSULE, EXTENDED RELEASE ORAL at 14:38

## 2021-01-19 NOTE — ED PROVIDER NOTE - PATIENT PORTAL LINK FT
You can access the FollowMyHealth Patient Portal offered by [facility] by registering at the following website: [email or web address]. By joining [employer]’s FollowMyHealth portal, you will also be able to view your health information using other applications (apps) compatible with our system.

## 2021-01-19 NOTE — ED PROVIDER NOTE - PHYSICAL EXAMINATION
CONSTITUTIONAL: Well-appearing; well-nourished; in no apparent distress.   	HEAD: Normocephalic; atraumatic.   	EYES:  conjunctiva and sclera clear  	ENT: normal nose; no rhinorrhea; normal pharynx with no erythema or lesions.   	NECK: Supple; non-tender;   	CARDIOVASCULAR: Normal S1, S2; no murmurs, rubs, or gallops. Regular rate and rhythm.   	RESPIRATORY: Breathing easily; breath sounds clear and equal bilaterally; no wheezes, rhonchi, or rales.  	GI: Soft; non-distended; +mild RUQ and epigastric tenderness. no guarding or rebound.   	EXT: No cyanosis or edema; N/V intact  	SKIN: +cracked skin to tips of fingers and toes, no erythema/drainage or tenderness. good cap refill. no signs of malperfusion or infection, nvi, distal pulses intact.   	NEURO: A & O x 3; face symmetric; grossly unremarkable.   PSYCHOLOGICAL: The patient’s mood and manner are appropriate.

## 2021-01-19 NOTE — ED PROVIDER NOTE - NSFOLLOWUPINSTRUCTIONS_ED_ALL_ED_FT
Please follow up with your doctor specifically GI doctor.     RETURN TO THE EMERGENCY DEPARTMENT FOR WORSENING PAIN, SWELLING, VOMITING, FEVER OR ANY CONCERNS.

## 2021-01-19 NOTE — ED PROVIDER NOTE - OBJECTIVE STATEMENT
54 yo F with PMHx of asthma, no h/o intubation, baseline peak flow unknown, recently dx with COVID 12/07/20, DM, presents c/o cracked dry skin to fingertips and some toes due to cold weather noticed a few days ago. also c/o intermittent upper abdominal pain over the past week, more to epigastric and RUQ, denies alleviating or provoking factors. no relation to food. denies chest pain or dyspnea. no URI symptoms. denies fever/chills/chest pain or dyspnea. on creon for chronic pancreatitis?, under care of GI and has follow up with GI appointment coming up.

## 2021-01-19 NOTE — ED PROVIDER NOTE - CLINICAL SUMMARY MEDICAL DECISION MAKING FREE TEXT BOX
well appearing, NAD, here with abd pain x 1 week, +ttp epigastric/RUQ, will check labs, US, patient declining analgesia, well appearing, NAD. also with dry cracked skin to fingertips and toes due to cold weather, no signs of infection or malperfusion, advised moisturizer. reassess.

## 2021-02-12 DIAGNOSIS — Z71.89 OTHER SPECIFIED COUNSELING: ICD-10-CM

## 2021-02-16 ENCOUNTER — EMERGENCY (EMERGENCY)
Facility: HOSPITAL | Age: 56
LOS: 1 days | Discharge: HOME | End: 2021-02-16
Attending: EMERGENCY MEDICINE | Admitting: EMERGENCY MEDICINE
Payer: MEDICAID

## 2021-02-16 VITALS
HEIGHT: 65 IN | OXYGEN SATURATION: 98 % | DIASTOLIC BLOOD PRESSURE: 81 MMHG | SYSTOLIC BLOOD PRESSURE: 140 MMHG | HEART RATE: 95 BPM | WEIGHT: 199.96 LBS | RESPIRATION RATE: 17 BRPM | TEMPERATURE: 97 F

## 2021-02-16 DIAGNOSIS — R51.9 HEADACHE, UNSPECIFIED: ICD-10-CM

## 2021-02-16 DIAGNOSIS — Z98.51 TUBAL LIGATION STATUS: Chronic | ICD-10-CM

## 2021-02-16 LAB
ALBUMIN SERPL ELPH-MCNC: 3.7 G/DL — SIGNIFICANT CHANGE UP (ref 3.4–5)
ALP SERPL-CCNC: 95 U/L — SIGNIFICANT CHANGE UP (ref 40–120)
ALT FLD-CCNC: 23 U/L — SIGNIFICANT CHANGE UP (ref 12–42)
ANION GAP SERPL CALC-SCNC: 11 MMOL/L — SIGNIFICANT CHANGE UP (ref 9–16)
APTT BLD: 34.9 SEC — SIGNIFICANT CHANGE UP (ref 27.5–35.5)
AST SERPL-CCNC: 20 U/L — SIGNIFICANT CHANGE UP (ref 15–37)
BASOPHILS # BLD AUTO: 0.04 K/UL — SIGNIFICANT CHANGE UP (ref 0–0.2)
BASOPHILS NFR BLD AUTO: 0.4 % — SIGNIFICANT CHANGE UP (ref 0–2)
BILIRUB SERPL-MCNC: 0.2 MG/DL — SIGNIFICANT CHANGE UP (ref 0.2–1.2)
BUN SERPL-MCNC: 15 MG/DL — SIGNIFICANT CHANGE UP (ref 7–23)
CALCIUM SERPL-MCNC: 10.1 MG/DL — SIGNIFICANT CHANGE UP (ref 8.5–10.5)
CHLORIDE SERPL-SCNC: 106 MMOL/L — SIGNIFICANT CHANGE UP (ref 96–108)
CO2 SERPL-SCNC: 25 MMOL/L — SIGNIFICANT CHANGE UP (ref 22–31)
CREAT SERPL-MCNC: 0.85 MG/DL — SIGNIFICANT CHANGE UP (ref 0.5–1.3)
EOSINOPHIL # BLD AUTO: 0.17 K/UL — SIGNIFICANT CHANGE UP (ref 0–0.5)
EOSINOPHIL NFR BLD AUTO: 1.5 % — SIGNIFICANT CHANGE UP (ref 0–6)
GLUCOSE SERPL-MCNC: 116 MG/DL — HIGH (ref 70–99)
HCT VFR BLD CALC: 37.6 % — SIGNIFICANT CHANGE UP (ref 34.5–45)
HGB BLD-MCNC: 12 G/DL — SIGNIFICANT CHANGE UP (ref 11.5–15.5)
IMM GRANULOCYTES NFR BLD AUTO: 0.4 % — SIGNIFICANT CHANGE UP (ref 0–1.5)
INR BLD: 1.04 — SIGNIFICANT CHANGE UP (ref 0.88–1.16)
LYMPHOCYTES # BLD AUTO: 29.7 % — SIGNIFICANT CHANGE UP (ref 13–44)
LYMPHOCYTES # BLD AUTO: 3.29 K/UL — SIGNIFICANT CHANGE UP (ref 1–3.3)
MCHC RBC-ENTMCNC: 27.3 PG — SIGNIFICANT CHANGE UP (ref 27–34)
MCHC RBC-ENTMCNC: 31.9 GM/DL — LOW (ref 32–36)
MCV RBC AUTO: 85.5 FL — SIGNIFICANT CHANGE UP (ref 80–100)
MONOCYTES # BLD AUTO: 0.74 K/UL — SIGNIFICANT CHANGE UP (ref 0–0.9)
MONOCYTES NFR BLD AUTO: 6.7 % — SIGNIFICANT CHANGE UP (ref 2–14)
NEUTROPHILS # BLD AUTO: 6.81 K/UL — SIGNIFICANT CHANGE UP (ref 1.8–7.4)
NEUTROPHILS NFR BLD AUTO: 61.3 % — SIGNIFICANT CHANGE UP (ref 43–77)
NRBC # BLD: 0 /100 WBCS — SIGNIFICANT CHANGE UP (ref 0–0)
PLATELET # BLD AUTO: 251 K/UL — SIGNIFICANT CHANGE UP (ref 150–400)
POTASSIUM SERPL-MCNC: 4 MMOL/L — SIGNIFICANT CHANGE UP (ref 3.5–5.3)
POTASSIUM SERPL-SCNC: 4 MMOL/L — SIGNIFICANT CHANGE UP (ref 3.5–5.3)
PROT SERPL-MCNC: 7.3 G/DL — SIGNIFICANT CHANGE UP (ref 6.4–8.2)
PROTHROM AB SERPL-ACNC: 12.5 SEC — SIGNIFICANT CHANGE UP (ref 10.6–13.6)
RBC # BLD: 4.4 M/UL — SIGNIFICANT CHANGE UP (ref 3.8–5.2)
RBC # FLD: 14.1 % — SIGNIFICANT CHANGE UP (ref 10.3–14.5)
SODIUM SERPL-SCNC: 142 MMOL/L — SIGNIFICANT CHANGE UP (ref 132–145)
WBC # BLD: 11.09 K/UL — HIGH (ref 3.8–10.5)
WBC # FLD AUTO: 11.09 K/UL — HIGH (ref 3.8–10.5)

## 2021-02-16 RX ORDER — METOCLOPRAMIDE HCL 10 MG
10 TABLET ORAL ONCE
Refills: 0 | Status: COMPLETED | OUTPATIENT
Start: 2021-02-16 | End: 2021-02-16

## 2021-02-16 RX ORDER — DEXAMETHASONE 0.5 MG/5ML
6 ELIXIR ORAL ONCE
Refills: 0 | Status: COMPLETED | OUTPATIENT
Start: 2021-02-16 | End: 2021-02-16

## 2021-02-16 RX ADMIN — Medication 10 MILLIGRAM(S): at 13:17

## 2021-02-16 RX ADMIN — Medication 6 MILLIGRAM(S): at 13:16

## 2021-02-16 NOTE — ED PROVIDER NOTE - OBJECTIVE STATEMENT
55 female pt, hx of HLD, DM, intracranial htn, presents w persistent headache for 2 weeks. Pain is occipital at times and frontal at times, described as pressure. Pt went to Neuro 2 weeks ago and no intervention recommended at the time. no fever, no chills, no neck pain, no sob, no chest pain, no abd pain.

## 2021-02-16 NOTE — ED PROVIDER NOTE - PROGRESS NOTE DETAILS
Pt w normal work up today, much improved after meds, spoke to SM to coordinate f/u w neuro and primary care. Will DC w reglan/motrin, encouraged to RTER for any worsening.

## 2021-02-16 NOTE — ED PROVIDER NOTE - CLINICAL SUMMARY MEDICAL DECISION MAKING FREE TEXT BOX
Pt w difuse frontal/occipital headache, similar to prior episodes associated to intracranial hypertension, already had some motrin prior to arrival. Will get basic labs, treat w reglan/decadron, get new imaging. Will reassess. Pt is well appearing and has no focal deficits.

## 2021-02-16 NOTE — ED PROVIDER NOTE - CPE EDP SKIN NORM
Zurdo Dash is a 70 year old male who comes in today for a Blood Pressure check because of medication change.    Vitals as recorded, a regular cuff was used.    Patient is tolerating medications well.  Patient is not monitoring Blood Pressure at home.   Current complaints: none    Per last office visit patient was suppose to stop HCTZ and metoprolol, patient thinks that he may still be taking these and he stopped his Lisinopril instead. Patient will try to check his medication when he gets home. He is unsure if he will be able to tell which pills are which, he uses a pill box.  Patient can be reached today after 3 pm at [phone].    Disposition: results routed to MD/ALTA     normal...

## 2021-02-16 NOTE — ED ADULT NURSE NOTE - CHIEF COMPLAINT
Pharmacy contacting MD for refill of OCPs.  Last seen for physical on 9/28/20, last refill 8/18/20.  Routed to Dr. Durand.    The patient is a 55y Female complaining of headache.

## 2021-02-16 NOTE — ED PROVIDER NOTE - PATIENT PORTAL LINK FT
You can access the FollowMyHealth Patient Portal offered by [facility] by registering at the following website: [email or web address]. By joining [employer]’s FollowMyHealth portal, you will also be able to view your health information using other applications (apps) compatible with our system.

## 2021-02-18 ENCOUNTER — APPOINTMENT (OUTPATIENT)
Dept: INTERNAL MEDICINE | Facility: CLINIC | Age: 56
End: 2021-02-18

## 2021-03-11 ENCOUNTER — APPOINTMENT (OUTPATIENT)
Dept: NEUROLOGY | Facility: CLINIC | Age: 56
End: 2021-03-11
Payer: MEDICAID

## 2021-03-11 VITALS
DIASTOLIC BLOOD PRESSURE: 90 MMHG | SYSTOLIC BLOOD PRESSURE: 147 MMHG | HEIGHT: 66 IN | BODY MASS INDEX: 32.14 KG/M2 | WEIGHT: 200 LBS | TEMPERATURE: 97.8 F | OXYGEN SATURATION: 98 % | HEART RATE: 76 BPM

## 2021-03-22 ENCOUNTER — NON-APPOINTMENT (OUTPATIENT)
Age: 56
End: 2021-03-22

## 2021-03-22 ENCOUNTER — APPOINTMENT (OUTPATIENT)
Dept: OPHTHALMOLOGY | Facility: CLINIC | Age: 56
End: 2021-03-22
Payer: MEDICAID

## 2021-03-25 PROBLEM — Z12.11 COLON CANCER SCREENING: Status: ACTIVE | Noted: 2021-03-25

## 2021-03-25 PROBLEM — Z91.89 AT RISK FOR OSTEOPOROSIS: Status: ACTIVE | Noted: 2021-03-25

## 2021-03-25 PROBLEM — Z13.220 LIPID SCREENING: Status: ACTIVE | Noted: 2021-03-25

## 2021-03-25 PROBLEM — Z12.39 BREAST CANCER SCREENING: Status: ACTIVE | Noted: 2021-03-25

## 2021-03-26 ENCOUNTER — APPOINTMENT (OUTPATIENT)
Dept: INTERNAL MEDICINE | Facility: CLINIC | Age: 56
End: 2021-03-26
Payer: MEDICAID

## 2021-03-26 DIAGNOSIS — Z12.11 ENCOUNTER FOR SCREENING FOR MALIGNANT NEOPLASM OF COLON: ICD-10-CM

## 2021-03-26 DIAGNOSIS — Z91.89 OTHER SPECIFIED PERSONAL RISK FACTORS, NOT ELSEWHERE CLASSIFIED: ICD-10-CM

## 2021-03-26 DIAGNOSIS — Z13.220 ENCOUNTER FOR SCREENING FOR LIPOID DISORDERS: ICD-10-CM

## 2021-03-26 DIAGNOSIS — Z12.39 ENCOUNTER FOR OTHER SCREENING FOR MALIGNANT NEOPLASM OF BREAST: ICD-10-CM

## 2021-04-05 ENCOUNTER — EMERGENCY (EMERGENCY)
Facility: HOSPITAL | Age: 56
LOS: 1 days | Discharge: HOME | End: 2021-04-05
Attending: EMERGENCY MEDICINE | Admitting: EMERGENCY MEDICINE
Payer: MEDICAID

## 2021-04-05 VITALS
OXYGEN SATURATION: 98 % | TEMPERATURE: 98 F | WEIGHT: 199.96 LBS | DIASTOLIC BLOOD PRESSURE: 79 MMHG | RESPIRATION RATE: 16 BRPM | HEART RATE: 95 BPM | SYSTOLIC BLOOD PRESSURE: 136 MMHG | HEIGHT: 65 IN

## 2021-04-05 DIAGNOSIS — Z98.51 TUBAL LIGATION STATUS: Chronic | ICD-10-CM

## 2021-04-05 RX ORDER — GABAPENTIN 400 MG/1
1 CAPSULE ORAL
Qty: 15 | Refills: 0
Start: 2021-04-05 | End: 2021-04-09

## 2021-04-05 RX ORDER — GABAPENTIN 400 MG/1
100 CAPSULE ORAL ONCE
Refills: 0 | Status: COMPLETED | OUTPATIENT
Start: 2021-04-05 | End: 2021-04-05

## 2021-04-05 RX ADMIN — GABAPENTIN 100 MILLIGRAM(S): 400 CAPSULE ORAL at 08:59

## 2021-04-05 NOTE — ED PROVIDER NOTE - NS ED ROS FT
General: denies fever, chills  HENT: denies nasal congestion, rhinorrhea  Eyes: denies visual changes, blurred vision  CV: denies chest pain, palpitations  Resp: denies difficulty breathing, cough  Abdominal: denies nausea, vomiting, diarrhea, abdominal pain  MSK: denies muscle aches, leg swelling, + hand and foot paihn  Neuro: denies headaches, + numbness  Skin: denies rashes, bruises

## 2021-04-05 NOTE — ED PROVIDER NOTE - PATIENT PORTAL LINK FT
You can access the FollowMyHealth Patient Portal offered by [facility] by registering at the following website: [email or web address]. By joining [employer]’s FollowMyHealth portal, you will also be able to view your health information using other applications (apps) compatible with our system.

## 2021-04-05 NOTE — ED PROVIDER NOTE - OBJECTIVE STATEMENT
55M w/ h/o HTN, HLD, DM, arthritis p/w 3 months of chronic bilateral wrist and hand pain. States she started having similar symptoms in the bilateral feet recently. States symptoms started after she had covid. Denies f/c, n/v/d, abd pain, chest pain. States she is on metformin for her DM and it has been well controlled. Denies trauma or other injuries.     used

## 2021-04-05 NOTE — ED PROVIDER NOTE - ATTENDING CONTRIBUTION TO CARE
I have seen the pt, reviewed all pertinent clinical data, and I agree with the documentation/care/plan executed by Dr. Solano. 56 y/o F w/hx DM, HTN, HLD, p/w b/l hand burning for 3 months, now also having similar sx to b/l feet. No trauma. No skin changes. Denies positional aspect to sx. No fever/chills. VSS, neuro/vasc intact on exam w/symmetric appearance of extremities, likely neuropathy from DM, unlikely central cause. trial of gabapentin, f/u with PMD later this week.

## 2021-04-05 NOTE — ED PROVIDER NOTE - PHYSICAL EXAMINATION
CONSTITUTIONAL: NAD, awake, alert  HEAD: Normocephalic; atraumatic  ENMT: External appears normal, MMM  NECK: no tenderness, FROM  CARD: Normal Sl, S2; no audible murmurs  RESP: normal wob, lungs ctab  ABD: soft, non-distended; non-tender  MSK: no edema, normal ROM in all four extremities, pulses intact throughout  SKIN: Warm, dry, L lateral wrist birthmark noted  NEURO: aaox3, moving all extremities spontaneously, + 5/5 strength throughout, sensation decreased through bilateral hands in radial ulnar and median nerve distribution, distal toe and dorsal foot decreased sensation

## 2021-04-05 NOTE — ED PROVIDER NOTE - NSFOLLOWUPINSTRUCTIONS_ED_ALL_ED_FT
Neuropathy    Peripheral neuropathy is a type of nerve damage that affects nerves that carry signals between the spinal cord and other parts of the body. Many things can damage peripheral nerves including vascular disease or diabetes. Signs and symptoms of neuropathy include numbness, tingling, pain, sensitive skin, weakness/paralysis of a body part, muscle twitching, loss of balance, not being able to control your bladder or sexual problems. If you have numbness in your feet, check every day for signs of infection including redness, warmth, and swelling.      SEEK IMMEDIATE MEDICAL CARE IF YOU HAVE ANY OF THE FOLLOWING SYMPTOMS: an injury or infection that is not healing, dizziness, vomiting, paralysis, chest pain, or trouble breathing.    - Follow up with your primary care doctor in 1-2 days.    - Bring results with you to the appointment.   - Return to the ED for new or worsening symptoms.

## 2021-04-08 DIAGNOSIS — Z86.16 PERSONAL HISTORY OF COVID-19: ICD-10-CM

## 2021-04-08 DIAGNOSIS — J45.909 UNSPECIFIED ASTHMA, UNCOMPLICATED: ICD-10-CM

## 2021-04-08 DIAGNOSIS — I10 ESSENTIAL (PRIMARY) HYPERTENSION: ICD-10-CM

## 2021-04-08 DIAGNOSIS — Z79.899 OTHER LONG TERM (CURRENT) DRUG THERAPY: ICD-10-CM

## 2021-04-08 DIAGNOSIS — E78.5 HYPERLIPIDEMIA, UNSPECIFIED: ICD-10-CM

## 2021-04-08 DIAGNOSIS — M25.531 PAIN IN RIGHT WRIST: ICD-10-CM

## 2021-04-08 DIAGNOSIS — E11.40 TYPE 2 DIABETES MELLITUS WITH DIABETIC NEUROPATHY, UNSPECIFIED: ICD-10-CM

## 2021-04-08 DIAGNOSIS — Z79.52 LONG TERM (CURRENT) USE OF SYSTEMIC STEROIDS: ICD-10-CM

## 2021-04-13 ENCOUNTER — APPOINTMENT (OUTPATIENT)
Dept: INTERNAL MEDICINE | Facility: CLINIC | Age: 56
End: 2021-04-13
Payer: MEDICAID

## 2021-04-13 ENCOUNTER — LABORATORY RESULT (OUTPATIENT)
Age: 56
End: 2021-04-13

## 2021-04-13 VITALS
DIASTOLIC BLOOD PRESSURE: 78 MMHG | HEART RATE: 81 BPM | SYSTOLIC BLOOD PRESSURE: 126 MMHG | TEMPERATURE: 98.1 F | BODY MASS INDEX: 31.96 KG/M2 | WEIGHT: 198 LBS | OXYGEN SATURATION: 98 %

## 2021-04-13 DIAGNOSIS — G47.00 INSOMNIA, UNSPECIFIED: ICD-10-CM

## 2021-04-13 DIAGNOSIS — R32 UNSPECIFIED URINARY INCONTINENCE: ICD-10-CM

## 2021-04-13 DIAGNOSIS — G93.2 BENIGN INTRACRANIAL HYPERTENSION: ICD-10-CM

## 2021-04-13 DIAGNOSIS — Z56.0 UNEMPLOYMENT, UNSPECIFIED: ICD-10-CM

## 2021-04-13 DIAGNOSIS — E78.49 OTHER HYPERLIPIDEMIA: ICD-10-CM

## 2021-04-13 DIAGNOSIS — Z78.9 OTHER SPECIFIED HEALTH STATUS: ICD-10-CM

## 2021-04-13 DIAGNOSIS — Z00.00 ENCOUNTER FOR GENERAL ADULT MEDICAL EXAMINATION W/OUT ABNORMAL FINDINGS: ICD-10-CM

## 2021-04-13 RX ORDER — IBUPROFEN 600 MG/1
600 TABLET, FILM COATED ORAL 3 TIMES DAILY
Qty: 30 | Refills: 1 | Status: ACTIVE | COMMUNITY
Start: 2021-04-13 | End: 1900-01-01

## 2021-04-13 RX ORDER — FLUTICASONE PROPIONATE 50 UG/1
50 SPRAY, METERED NASAL
Refills: 0 | Status: ACTIVE | COMMUNITY
Start: 2021-04-13

## 2021-04-13 SDOH — ECONOMIC STABILITY - INCOME SECURITY: UNEMPLOYMENT, UNSPECIFIED: Z56.0

## 2021-04-14 LAB
25(OH)D3 SERPL-MCNC: 38.1 NG/ML
ALBUMIN SERPL ELPH-MCNC: 4.3 G/DL
ALP BLD-CCNC: 112 U/L
ALT SERPL-CCNC: 16 U/L
ANION GAP SERPL CALC-SCNC: 12 MMOL/L
APPEARANCE: ABNORMAL
AST SERPL-CCNC: 19 U/L
BASOPHILS # BLD AUTO: 0.03 K/UL
BASOPHILS NFR BLD AUTO: 0.3 %
BILIRUB SERPL-MCNC: 0.2 MG/DL
BILIRUBIN URINE: NEGATIVE
BLOOD URINE: NEGATIVE
BUN SERPL-MCNC: 11 MG/DL
CALCIUM SERPL-MCNC: 9.9 MG/DL
CHLORIDE SERPL-SCNC: 106 MMOL/L
CHOLEST SERPL-MCNC: 149 MG/DL
CO2 SERPL-SCNC: 23 MMOL/L
COLOR: YELLOW
CREAT SERPL-MCNC: 0.6 MG/DL
EOSINOPHIL # BLD AUTO: 0.14 K/UL
EOSINOPHIL NFR BLD AUTO: 1.5 %
ESTIMATED AVERAGE GLUCOSE: 143 MG/DL
GLUCOSE QUALITATIVE U: NEGATIVE
GLUCOSE SERPL-MCNC: 97 MG/DL
HBA1C MFR BLD HPLC: 6.6 %
HCT VFR BLD CALC: 41.1 %
HDLC SERPL-MCNC: 52 MG/DL
HGB BLD-MCNC: 12.3 G/DL
IMM GRANULOCYTES NFR BLD AUTO: 0.2 %
KETONES URINE: NEGATIVE
LDLC SERPL CALC-MCNC: 71 MG/DL
LEUKOCYTE ESTERASE URINE: NEGATIVE
LYMPHOCYTES # BLD AUTO: 2.78 K/UL
LYMPHOCYTES NFR BLD AUTO: 29.8 %
MAN DIFF?: NORMAL
MCHC RBC-ENTMCNC: 27.8 PG
MCHC RBC-ENTMCNC: 29.9 GM/DL
MCV RBC AUTO: 93 FL
MONOCYTES # BLD AUTO: 0.56 K/UL
MONOCYTES NFR BLD AUTO: 6 %
NEUTROPHILS # BLD AUTO: 5.81 K/UL
NEUTROPHILS NFR BLD AUTO: 62.2 %
NITRITE URINE: NEGATIVE
NONHDLC SERPL-MCNC: 97 MG/DL
PH URINE: 7.5
PLATELET # BLD AUTO: 253 K/UL
POTASSIUM SERPL-SCNC: 4.3 MMOL/L
PROT SERPL-MCNC: 6.7 G/DL
PROTEIN URINE: NEGATIVE
RBC # BLD: 4.42 M/UL
RBC # FLD: 13.7 %
SODIUM SERPL-SCNC: 141 MMOL/L
SPECIFIC GRAVITY URINE: 1.01
TRIGL SERPL-MCNC: 132 MG/DL
UROBILINOGEN URINE: NORMAL
WBC # FLD AUTO: 9.34 K/UL

## 2021-04-15 ENCOUNTER — APPOINTMENT (OUTPATIENT)
Dept: NEUROLOGY | Facility: CLINIC | Age: 56
End: 2021-04-15
Payer: MEDICAID

## 2021-04-15 VITALS
DIASTOLIC BLOOD PRESSURE: 76 MMHG | OXYGEN SATURATION: 98 % | HEART RATE: 86 BPM | BODY MASS INDEX: 31.82 KG/M2 | TEMPERATURE: 97.6 F | SYSTOLIC BLOOD PRESSURE: 121 MMHG | WEIGHT: 198 LBS | HEIGHT: 66 IN

## 2021-04-22 ENCOUNTER — EMERGENCY (EMERGENCY)
Facility: HOSPITAL | Age: 56
LOS: 1 days | Discharge: HOME | End: 2021-04-22
Admitting: EMERGENCY MEDICINE
Payer: MEDICAID

## 2021-04-22 VITALS
WEIGHT: 190.04 LBS | TEMPERATURE: 98 F | HEIGHT: 65 IN | DIASTOLIC BLOOD PRESSURE: 82 MMHG | SYSTOLIC BLOOD PRESSURE: 144 MMHG | OXYGEN SATURATION: 96 % | RESPIRATION RATE: 17 BRPM | HEART RATE: 98 BPM

## 2021-04-22 VITALS
OXYGEN SATURATION: 99 % | DIASTOLIC BLOOD PRESSURE: 72 MMHG | RESPIRATION RATE: 16 BRPM | SYSTOLIC BLOOD PRESSURE: 116 MMHG | HEART RATE: 74 BPM | TEMPERATURE: 98 F

## 2021-04-22 DIAGNOSIS — E11.9 TYPE 2 DIABETES MELLITUS WITHOUT COMPLICATIONS: ICD-10-CM

## 2021-04-22 DIAGNOSIS — J45.909 UNSPECIFIED ASTHMA, UNCOMPLICATED: ICD-10-CM

## 2021-04-22 DIAGNOSIS — Z98.51 TUBAL LIGATION STATUS: Chronic | ICD-10-CM

## 2021-04-22 DIAGNOSIS — E78.5 HYPERLIPIDEMIA, UNSPECIFIED: ICD-10-CM

## 2021-04-22 DIAGNOSIS — I10 ESSENTIAL (PRIMARY) HYPERTENSION: ICD-10-CM

## 2021-04-22 DIAGNOSIS — K29.70 GASTRITIS, UNSPECIFIED, WITHOUT BLEEDING: ICD-10-CM

## 2021-04-22 DIAGNOSIS — R10.13 EPIGASTRIC PAIN: ICD-10-CM

## 2021-04-22 LAB
ALBUMIN SERPL ELPH-MCNC: 3.4 G/DL — SIGNIFICANT CHANGE UP (ref 3.4–5)
ALP SERPL-CCNC: 109 U/L — SIGNIFICANT CHANGE UP (ref 40–120)
ALT FLD-CCNC: 19 U/L — SIGNIFICANT CHANGE UP (ref 12–42)
ANION GAP SERPL CALC-SCNC: 11 MMOL/L — SIGNIFICANT CHANGE UP (ref 9–16)
APPEARANCE UR: CLEAR — SIGNIFICANT CHANGE UP
APTT BLD: 34.7 SEC — SIGNIFICANT CHANGE UP (ref 27.5–35.5)
AST SERPL-CCNC: 17 U/L — SIGNIFICANT CHANGE UP (ref 15–37)
BASOPHILS # BLD AUTO: 0.04 K/UL — SIGNIFICANT CHANGE UP (ref 0–0.2)
BASOPHILS NFR BLD AUTO: 0.4 % — SIGNIFICANT CHANGE UP (ref 0–2)
BILIRUB SERPL-MCNC: 0.3 MG/DL — SIGNIFICANT CHANGE UP (ref 0.2–1.2)
BILIRUB UR-MCNC: NEGATIVE — SIGNIFICANT CHANGE UP
BUN SERPL-MCNC: 16 MG/DL — SIGNIFICANT CHANGE UP (ref 7–23)
CALCIUM SERPL-MCNC: 9.3 MG/DL — SIGNIFICANT CHANGE UP (ref 8.5–10.5)
CHLORIDE SERPL-SCNC: 106 MMOL/L — SIGNIFICANT CHANGE UP (ref 96–108)
CO2 SERPL-SCNC: 25 MMOL/L — SIGNIFICANT CHANGE UP (ref 22–31)
COLOR SPEC: YELLOW — SIGNIFICANT CHANGE UP
CREAT SERPL-MCNC: 0.67 MG/DL — SIGNIFICANT CHANGE UP (ref 0.5–1.3)
DIFF PNL FLD: NEGATIVE — SIGNIFICANT CHANGE UP
EOSINOPHIL # BLD AUTO: 0.28 K/UL — SIGNIFICANT CHANGE UP (ref 0–0.5)
EOSINOPHIL NFR BLD AUTO: 3.1 % — SIGNIFICANT CHANGE UP (ref 0–6)
GLUCOSE SERPL-MCNC: 135 MG/DL — HIGH (ref 70–99)
GLUCOSE UR QL: NEGATIVE — SIGNIFICANT CHANGE UP
HCT VFR BLD CALC: 35.8 % — SIGNIFICANT CHANGE UP (ref 34.5–45)
HGB BLD-MCNC: 11.7 G/DL — SIGNIFICANT CHANGE UP (ref 11.5–15.5)
IMM GRANULOCYTES NFR BLD AUTO: 0.3 % — SIGNIFICANT CHANGE UP (ref 0–1.5)
INR BLD: 1.09 — SIGNIFICANT CHANGE UP (ref 0.88–1.16)
KETONES UR-MCNC: NEGATIVE — SIGNIFICANT CHANGE UP
LEUKOCYTE ESTERASE UR-ACNC: NEGATIVE — SIGNIFICANT CHANGE UP
LIDOCAIN IGE QN: 94 U/L — SIGNIFICANT CHANGE UP (ref 73–393)
LYMPHOCYTES # BLD AUTO: 2.3 K/UL — SIGNIFICANT CHANGE UP (ref 1–3.3)
LYMPHOCYTES # BLD AUTO: 25.7 % — SIGNIFICANT CHANGE UP (ref 13–44)
MCHC RBC-ENTMCNC: 28 PG — SIGNIFICANT CHANGE UP (ref 27–34)
MCHC RBC-ENTMCNC: 32.7 GM/DL — SIGNIFICANT CHANGE UP (ref 32–36)
MCV RBC AUTO: 85.6 FL — SIGNIFICANT CHANGE UP (ref 80–100)
MONOCYTES # BLD AUTO: 0.53 K/UL — SIGNIFICANT CHANGE UP (ref 0–0.9)
MONOCYTES NFR BLD AUTO: 5.9 % — SIGNIFICANT CHANGE UP (ref 2–14)
NEUTROPHILS # BLD AUTO: 5.77 K/UL — SIGNIFICANT CHANGE UP (ref 1.8–7.4)
NEUTROPHILS NFR BLD AUTO: 64.6 % — SIGNIFICANT CHANGE UP (ref 43–77)
NITRITE UR-MCNC: NEGATIVE — SIGNIFICANT CHANGE UP
NRBC # BLD: 0 /100 WBCS — SIGNIFICANT CHANGE UP (ref 0–0)
PH UR: 5.5 — SIGNIFICANT CHANGE UP (ref 5–8)
PLATELET # BLD AUTO: 216 K/UL — SIGNIFICANT CHANGE UP (ref 150–400)
POTASSIUM SERPL-MCNC: 3.8 MMOL/L — SIGNIFICANT CHANGE UP (ref 3.5–5.3)
POTASSIUM SERPL-SCNC: 3.8 MMOL/L — SIGNIFICANT CHANGE UP (ref 3.5–5.3)
PROT SERPL-MCNC: 6.7 G/DL — SIGNIFICANT CHANGE UP (ref 6.4–8.2)
PROT UR-MCNC: NEGATIVE MG/DL — SIGNIFICANT CHANGE UP
PROTHROM AB SERPL-ACNC: 13 SEC — SIGNIFICANT CHANGE UP (ref 10.6–13.6)
RBC # BLD: 4.18 M/UL — SIGNIFICANT CHANGE UP (ref 3.8–5.2)
RBC # FLD: 13.2 % — SIGNIFICANT CHANGE UP (ref 10.3–14.5)
SODIUM SERPL-SCNC: 142 MMOL/L — SIGNIFICANT CHANGE UP (ref 132–145)
SP GR SPEC: 1.02 — SIGNIFICANT CHANGE UP (ref 1–1.03)
TROPONIN I SERPL-MCNC: <0.017 NG/ML — LOW (ref 0.02–0.06)
TROPONIN I SERPL-MCNC: <0.017 NG/ML — LOW (ref 0.02–0.06)
UROBILINOGEN FLD QL: 0.2 E.U./DL — SIGNIFICANT CHANGE UP
WBC # BLD: 8.95 K/UL — SIGNIFICANT CHANGE UP (ref 3.8–10.5)
WBC # FLD AUTO: 8.95 K/UL — SIGNIFICANT CHANGE UP (ref 3.8–10.5)

## 2021-04-22 RX ORDER — ESOMEPRAZOLE MAGNESIUM 40 MG/1
1 CAPSULE, DELAYED RELEASE ORAL
Qty: 30 | Refills: 0
Start: 2021-04-22 | End: 2021-05-21

## 2021-04-22 RX ORDER — ONDANSETRON 8 MG/1
4 TABLET, FILM COATED ORAL ONCE
Refills: 0 | Status: COMPLETED | OUTPATIENT
Start: 2021-04-22 | End: 2021-04-22

## 2021-04-22 RX ORDER — SODIUM CHLORIDE 9 MG/ML
1000 INJECTION INTRAMUSCULAR; INTRAVENOUS; SUBCUTANEOUS ONCE
Refills: 0 | Status: COMPLETED | OUTPATIENT
Start: 2021-04-22 | End: 2021-04-22

## 2021-04-22 RX ORDER — FAMOTIDINE 10 MG/ML
20 INJECTION INTRAVENOUS ONCE
Refills: 0 | Status: COMPLETED | OUTPATIENT
Start: 2021-04-22 | End: 2021-04-22

## 2021-04-22 RX ADMIN — ONDANSETRON 4 MILLIGRAM(S): 8 TABLET, FILM COATED ORAL at 09:18

## 2021-04-22 RX ADMIN — SODIUM CHLORIDE 1000 MILLILITER(S): 9 INJECTION INTRAMUSCULAR; INTRAVENOUS; SUBCUTANEOUS at 09:18

## 2021-04-22 RX ADMIN — SODIUM CHLORIDE 1000 MILLILITER(S): 9 INJECTION INTRAMUSCULAR; INTRAVENOUS; SUBCUTANEOUS at 10:51

## 2021-04-22 RX ADMIN — FAMOTIDINE 20 MILLIGRAM(S): 10 INJECTION INTRAVENOUS at 09:18

## 2021-04-22 NOTE — ED ADULT NURSE NOTE - OBJECTIVE STATEMENT
Pt aox3 with steady gait. Pt states increased upper ab pain radiating to chest x 3 days with increased sob and slight cough. pt denies productive cough or fevers. dry cough worsening at night time. Pt hx asthma, htn and dm. Pt denies n/v but states increased stool. denies diarrhea

## 2021-04-22 NOTE — ED ADULT NURSE NOTE - NSIMPLEMENTINTERV_GEN_ALL_ED
Implemented All Universal Safety Interventions:  [place] to call system. Call bell, personal items and telephone within reach. Instruct patient to call for assistance. Room bathroom lighting operational. Non-slip footwear when patient is off stretcher. Physically safe environment: no spills, clutter or unnecessary equipment. Stretcher in lowest position, wheels locked, appropriate side rails in place.

## 2021-04-22 NOTE — ED PROVIDER NOTE - PATIENT PORTAL LINK FT
You can access the FollowMyHealth Patient Portal offered by [facility] by registering at the following website: [email or web address]. By joining [employer]’s FollowMyHealth portal, you will also be able to view your health information using other applications (apps) compatible with our system.

## 2021-04-22 NOTE — ED ADULT TRIAGE NOTE - CHIEF COMPLAINT QUOTE
walk in c/o generalized abdominal pain, sore throat and heartburn x 2 days.  +nausea +HA. PMH Asthma HTN takes Losartan 100

## 2021-04-22 NOTE — ED PROVIDER NOTE - CARE PROVIDERS DIRECT ADDRESSES
,[email or web address]

## 2021-04-22 NOTE — ED PROVIDER NOTE - OBJECTIVE STATEMENT
54 y/o F with PMHx of DM, HTN, and asthma presents to the ED for 2 days of non-specific mild epigastric pain that radiates somewhat substernally. Pt also reports associated nausea, sore throat, and mild soreness to the lower sternal chest which he feels is GI related. Pt has been tolerating oral/liquid intake. He states he had a prior "pancreas problem" from several years ago which was relieved with Creon but he does not recall the full details. No aggravating or relieving factors. Pt denies CP, SOB, fevers, chills, changes in bowel movements, vomiting, dizziness, syncope, palpitations, coughs, hemoptysis, hematemesis,   hematochezia, melena, dysuria, hematuria, and frequency.

## 2021-04-22 NOTE — ED PROVIDER NOTE - CLINICAL SUMMARY MEDICAL DECISION MAKING FREE TEXT BOX
54 y/o F with Hx of DM, HTN, and asthma presenting with 2 days of epigastric pain and associated nausea. Exam is remarkable for epigastric tenderness to palpation. No other concerning findings. Pt otherwise appears well and in no acute distress. Plan for routine labs, EKG, CXR, IV hydration, and antiemetics. Will reassess clinically after results have been obtained.

## 2021-04-22 NOTE — ED PROVIDER NOTE - CARE PROVIDER_API CALL
<<-----Click here for Discharge Medication Review
Deangelo Mendez)  Gastroenterology; Internal Medicine  [address] [place] [id number]  Phone: [phone]  Fax: [phone]  Follow Up Time: 1-3 Days

## 2021-04-28 ENCOUNTER — OUTPATIENT (OUTPATIENT)
Dept: OUTPATIENT SERVICES | Facility: HOSPITAL | Age: 56
LOS: 1 days | End: 2021-04-28

## 2021-04-28 ENCOUNTER — APPOINTMENT (OUTPATIENT)
Dept: MRI IMAGING | Facility: CLINIC | Age: 56
End: 2021-04-28
Payer: MEDICAID

## 2021-04-28 ENCOUNTER — RESULT REVIEW (OUTPATIENT)
Age: 56
End: 2021-04-28

## 2021-04-28 DIAGNOSIS — Z98.51 TUBAL LIGATION STATUS: Chronic | ICD-10-CM

## 2021-05-07 ENCOUNTER — OUTPATIENT (OUTPATIENT)
Dept: OUTPATIENT SERVICES | Facility: HOSPITAL | Age: 56
LOS: 1 days | End: 2021-05-07
Payer: MEDICAID

## 2021-05-07 ENCOUNTER — APPOINTMENT (OUTPATIENT)
Dept: ORTHOPEDIC SURGERY | Facility: CLINIC | Age: 56
End: 2021-05-07
Payer: MEDICAID

## 2021-05-07 ENCOUNTER — RESULT REVIEW (OUTPATIENT)
Age: 56
End: 2021-05-07

## 2021-05-07 VITALS — RESPIRATION RATE: 16 BRPM | BODY MASS INDEX: 32.14 KG/M2 | HEIGHT: 66 IN | WEIGHT: 200 LBS

## 2021-05-07 DIAGNOSIS — Z86.39 PERSONAL HISTORY OF OTHER ENDOCRINE, NUTRITIONAL AND METABOLIC DISEASE: ICD-10-CM

## 2021-05-07 DIAGNOSIS — R20.0 ANESTHESIA OF SKIN: ICD-10-CM

## 2021-05-07 DIAGNOSIS — Z86.79 PERSONAL HISTORY OF OTHER DISEASES OF THE CIRCULATORY SYSTEM: ICD-10-CM

## 2021-05-07 DIAGNOSIS — Z87.19 PERSONAL HISTORY OF OTHER DISEASES OF THE DIGESTIVE SYSTEM: ICD-10-CM

## 2021-05-07 DIAGNOSIS — Z98.51 TUBAL LIGATION STATUS: Chronic | ICD-10-CM

## 2021-05-11 ENCOUNTER — APPOINTMENT (OUTPATIENT)
Dept: PULMONOLOGY | Facility: CLINIC | Age: 56
End: 2021-05-11
Payer: MEDICAID

## 2021-05-11 VITALS
BODY MASS INDEX: 32.14 KG/M2 | OXYGEN SATURATION: 97 % | HEIGHT: 66 IN | DIASTOLIC BLOOD PRESSURE: 72 MMHG | WEIGHT: 200 LBS | HEART RATE: 95 BPM | TEMPERATURE: 97.2 F | SYSTOLIC BLOOD PRESSURE: 122 MMHG

## 2021-05-11 RX ORDER — ALBUTEROL SULFATE 90 UG/1
108 (90 BASE) INHALANT RESPIRATORY (INHALATION)
Qty: 1 | Refills: 2 | Status: ACTIVE | COMMUNITY
Start: 2021-04-13 | End: 1900-01-01

## 2021-05-11 NOTE — PHYSICAL EXAM
[No Acute Distress] : no acute distress [Normal Oropharynx] : normal oropharynx [Normal Appearance] : normal appearance [Normal Rate/Rhythm] : normal rate/rhythm [Normal S1, S2] : normal s1, s2 [No Resp Distress] : no resp distress [Clear to Auscultation Bilaterally] : clear to auscultation bilaterally [No Abnormalities] : no abnormalities [Benign] : benign [Normal Gait] : normal gait [No Clubbing] : no clubbing [Normal Color/ Pigmentation] : normal color/ pigmentation [No Focal Deficits] : no focal deficits [Oriented x3] : oriented x3 [Normal Affect] : normal affect

## 2021-05-12 NOTE — REASON FOR VISIT
[Consultation] : a consultation [Sleep Apnea] : sleep apnea [Shortness of Breath] : shortness of breath

## 2021-05-12 NOTE — HISTORY OF PRESENT ILLNESS
[Never] : never [Awakes Unrefreshed] : awakes unrefreshed [Difficulty Maintaining Sleep] : difficulty maintaining sleep [Frequent Nocturnal Awakening] : frequent nocturnal awakening [Snoring] : snoring [TextBox_4] : Patient is a 56 yo F, never smoker, with PMHx of asthma, HTN, GERD, DM who presents for SOB. States she had Covid this past December, never hospitalized or required oxygen but had multiple ED visits for SOB. After COVID resolved, her ET reduced from 10 to 2 blocks. Also reports waking up with SOB during the night that resolves with nebulizer use. She had recently presented to the ED for waking up gasping during the night and was told it was GERD and was started on PPI with some improvement in symptoms. Reports snoring but no witnessed apneas. Reports no problem with sleep initiation but has problem with sleep maintenance. She was previously following with Dr. Shweta Alexandre at [place] [facility]: [phone], and had PFTs but does not know the result. Patient was started on symbicort for asthma 2 years ago but has only been using it once every few weeks. Also had a negative cardiac workup.\par  [Difficulty Initiating Sleep] : does not have difficulty initiating sleep [Witnessed Apneas] : no witnessed apneas [Unusual Sleep Behavior] : no unusual sleep behavior [ESS] : 10

## 2021-05-12 NOTE — ASSESSMENT
[FreeTextEntry1] : CT chest from 1/12/2021 reviewed: no GGOs, no nodules\par Perfusion SPECT 4/2021 reviewed ([place]): normal\par \par Patient is a 54 yo F, never smoker, with PMHx of asthma, HTN, GERD, DM, recent Covid infection in December 2020 (never hospitalized or required oxygen) who presents with SOB and reduced ET after COVID. \par 1) Asthma: Patient with history of asthma, diagnosed 20 years ago, started on Symbicort 2 years ago but has not been consistent with use. Reports decreased exertional tolerance after COVID with increased SOB. May be related to deconditioning following COVID infection vs uncontrolled asthma. Currently not wheezing on exam. Patient followed with Dr. Shweta Alexandre at [place] [facility]: [phone], will obtain collateral and recent PFTs. Will have patient start taking Symbicort regularly, and continue albuterol PRN. Explained difference between maintenance and rescue inhalers and when to take each. \par 2) Evaluate for sleep disordered breathing. Patient with obesity, chronic snoring and problems with sleep maintenance with recent episodes of waking up gasping for breath. Referred to the [facility] for an HST.\par \par Follow up after HST and within 6 weeks.

## 2021-05-12 NOTE — CONSULT LETTER
La11/30/20, 3:18 PM EST  Discharge Planning Evaluation  Social work consult received, patient from [facility]. Sharri's preference is to return to The [address] [place] [address]. The patient's current payor source at the facility is Medicare and Medicaid. Medicare skilled days available: yes  Insurance precert:  no  Spoke with Son at the facility. Patient bed hold: yes  Anticipated transport plan: ambulance  Do they require COVID 19 test to return to [facility]:no  Is there a required time frame which which COVID test needs done:NA    Patient had recently been moved from [facility] to [address] [place] [address] of [facility]. She was moved due to her COVID diagnosis. She will have to stay at [address] [place] [address] until the facility clears her to return to [facility]. [Dear  ___] : Dear  [unfilled], [Consult Letter:] : I had the pleasure of evaluating your patient, [unfilled]. [Please see my note below.] : Please see my note below. [Consult Closing:] : Thank you very much for allowing me to participate in the care of this patient.  If you have any questions, please do not hesitate to contact me. [Sincerely,] : Sincerely, [FreeTextEntry3] : Marielle Dueans MD\par \par [employer] [facility]\par Pulmonary, Critical Care, and Sleep Medicine\par

## 2021-05-21 ENCOUNTER — APPOINTMENT (OUTPATIENT)
Dept: INTERNAL MEDICINE | Facility: CLINIC | Age: 56
End: 2021-05-21
Payer: MEDICAID

## 2021-05-21 ENCOUNTER — APPOINTMENT (OUTPATIENT)
Dept: ORTHOPEDIC SURGERY | Facility: CLINIC | Age: 56
End: 2021-05-21

## 2021-05-21 RX ORDER — METOCLOPRAMIDE 10 MG/1
10 TABLET ORAL 3 TIMES DAILY
Refills: 0 | Status: ACTIVE | COMMUNITY
Start: 2021-05-21

## 2021-05-21 RX ORDER — ATORVASTATIN CALCIUM 80 MG/1
80 TABLET, FILM COATED ORAL
Refills: 0 | Status: ACTIVE | COMMUNITY
Start: 2021-04-13

## 2021-06-14 ENCOUNTER — APPOINTMENT (OUTPATIENT)
Dept: NEUROLOGY | Facility: CLINIC | Age: 56
End: 2021-06-14
Payer: MEDICAID

## 2021-06-14 VITALS
TEMPERATURE: 98.1 F | OXYGEN SATURATION: 95 % | HEART RATE: 88 BPM | WEIGHT: 200 LBS | HEIGHT: 66 IN | DIASTOLIC BLOOD PRESSURE: 74 MMHG | RESPIRATION RATE: 16 BRPM | SYSTOLIC BLOOD PRESSURE: 119 MMHG | BODY MASS INDEX: 32.14 KG/M2

## 2021-06-14 NOTE — PROCEDURE
[FreeTextEntry1] : \par Nerve Conduction and Electromyography Report [FreeTextEntry3] :  Electro Physiologic Findings:  Limb temperature was monitored and maintained at approximately 30 – 34° C in the lower extremities, and 32 – 36° C in the upper extremities.  The right median sensory response was absent, and the mixed nerve response was slow across the wrist. The left median sensory response was low amplitude with focal slowing across the wrist; the mixed nerve response was slow across the wrist as well. The median distal motor latencies were prolonged bilaterally without conduction block; the right median motor velocity was mildly slow in the forearm.   The ulnar sensory and motor responses were normal bilaterally and symmetric. The superficial fibular sensory responses were normal bilaterally, and the right fibular and bilateral tibial motor responses were normal. The left tibial F-wave onsets were unclear, while the right tibial and fibular F-wave latencies were normal.   Needle electromyography was performed on the bilateral abductor pollicis brevis muscles. There was mild subacute to chronic denervation on the right, while the left was normal.   Clinical Electrophysiological Impression:   This electrodiagnostic study demonstrated moderate bilateral median nerve entrapments at the wrists, with sensorimotor slowing and sensory axon loss bilaterally, but worse on the right.   There was no evidence of polyneuropathy or ulnar nerve entrapment on this study.

## 2021-06-14 NOTE — HISTORY OF PRESENT ILLNESS
[FreeTextEntry1] : Referred by Dr. Dow and Dr. Raphael for hand numbness and tingling\par Symptoms started 2-3 years ago, bother her more at night, she wakes up and shakes her hands out \par She also is dropping things from her hands \par \par She also complains of pain in her feet when she walks, and burning in the feet \par She endorses neck pain radiating into her arms\par \par She has a history of diabetes mellitus\par \par Reviewed:\par x-ray wrists - mild degenerative changes\par notes from orthopedics, medicine \par Labs - A1C 6.6%

## 2021-06-14 NOTE — ASSESSMENT
[FreeTextEntry1] : Hand symptoms are consistent with carpal tunnel syndrome, which was confirmed on NCS/EMG - moderate in severity bilaterally\par No evidence of ulnar neuropathy or polyneuropathy \par Encouraged to get a wrist splint at least for the right hand and wear it every night\par f/u with Dr. Raphael to discuss further measures\par \par See separate procedure note for full results of study.

## 2021-06-14 NOTE — PHYSICAL EXAM
[FreeTextEntry1] : Gen: appears well, well-nourished, no acute distress\par \par Motor: normal bulk and tone, 5/5 strength throughout, no abnormal movements\par \par Sensory: vibration mildly reduced at toes b/l\par Tinel's sign equivocal at b/l wrists and left elbow \par \par Reflexes: 2+ symmetric UE and patellar, right achilles 2+ left 1+ \par \par Gait: normal

## 2021-06-14 NOTE — CONSULT LETTER
[Dear  ___] : Dear  [unfilled], [Consult Letter:] : I had the pleasure of evaluating your patient, [unfilled]. [Please see my note below.] : Please see my note below. [Consult Closing:] : Thank you very much for allowing me to participate in the care of this patient.  If you have any questions, please do not hesitate to contact me. [Sincerely,] : Sincerely, [FreeTextEntry3] : Altaf Norton M.D.\par Neurology, Electromyography and Neuromuscular Medicine\par [facility]\par \par  of Neurology\par [facility] [employer]

## 2021-06-14 NOTE — CONSULT LETTER
[Dear  ___] : Dear  [unfilled], [Consult Letter:] : I had the pleasure of evaluating your patient, [unfilled]. [Please see my note below.] : Please see my note below. [Consult Closing:] : Thank you very much for allowing me to participate in the care of this patient.  If you have any questions, please do not hesitate to contact me. [Sincerely,] : Sincerely, [FreeTextEntry3] : Altaf Norton M.D.\par Neurology, Electromyography and Neuromuscular Medicine\par [facility]\par \par  of Neurology\par [facility] [employer]

## 2021-06-17 ENCOUNTER — APPOINTMENT (OUTPATIENT)
Dept: NEUROLOGY | Facility: CLINIC | Age: 56
End: 2021-06-17
Payer: MEDICAID

## 2021-06-17 VITALS
TEMPERATURE: 97.2 F | SYSTOLIC BLOOD PRESSURE: 119 MMHG | DIASTOLIC BLOOD PRESSURE: 72 MMHG | HEIGHT: 66 IN | HEART RATE: 88 BPM | BODY MASS INDEX: 32.14 KG/M2 | OXYGEN SATURATION: 96 % | WEIGHT: 200 LBS

## 2021-06-23 ENCOUNTER — APPOINTMENT (OUTPATIENT)
Dept: PULMONOLOGY | Facility: CLINIC | Age: 56
End: 2021-06-23
Payer: MEDICAID

## 2021-06-23 VITALS
DIASTOLIC BLOOD PRESSURE: 79 MMHG | BODY MASS INDEX: 32.78 KG/M2 | OXYGEN SATURATION: 98 % | HEART RATE: 79 BPM | WEIGHT: 204 LBS | HEIGHT: 66 IN | SYSTOLIC BLOOD PRESSURE: 119 MMHG | TEMPERATURE: 97.5 F

## 2021-06-24 NOTE — REVIEW OF SYSTEMS
[Fatigue] : fatigue [Dry Eyes] : dry eyes [Sore Throat] : sore throat [Nasal Congestion] : nasal congestion [Postnasal Drip] : postnasal drip [Cough] : cough [Chest Tightness] : chest tightness [SOB on Exertion] : sob on exertion [Itchy Eyes] : itchy eyes [Seasonal Allergies] : seasonal allergies [Nasal Discharge] : nasal discharge [GERD] : gerd [Abdominal Pain] : abdominal pain [Negative] : Endocrine [Eye Irritation] : no eye irritation [Hemoptysis] : no hemoptysis [Sputum] : no sputum [Pleuritic Pain] : no pleuritic pain [Wheezing] : no wheezing

## 2021-06-24 NOTE — ASSESSMENT
[FreeTextEntry1] : REVIEWED:\par CT chest from 1/12/2021 reviewed: no GGOs, no nodules\par Perfusion SPECT 4/2021 reviewed ([place]): normal\par \par A/P\par 54yo woman and never smoker w/ PMH asthma, HTN, GERD, DM2, h/o mild COVID-19 infection (12/2020-never hospitalized or required oxygen) here for 6 week follow-up, with what seems to be worsening asthma symptoms, favor environmental allergy related in the setting of hookah smoke exposure, possibly complicated by GERD/reflux\par \par #Asthma - control remains suboptimal, likely due to increasing significance of seasonal allergic symptoms; not wheezing, and breathing otherwise comfortably in office\par - encouraged continue use of MARY when symptomatic up to q4-6h\par - continue symbicort BID\par - 3 day course of Prednisone prescribed\par - PPI rx provided; has GI follow up next week\par \par #Snoring, screening for sleep-disordered breathing/GREGORIO\par - patient to re-do HST with appropriate instructions and will follow on the result. Referred to the [facility]. The ramifications of GREGORIO and its treatment were discussed. \par \par Follow up in 4 weeks, sooner if asthma symptoms are not improved.\par \par Patient s/e/d with Dr. Duenas

## 2021-06-24 NOTE — PHYSICAL EXAM
[No Acute Distress] : no acute distress [Well Nourished] : well nourished [Well Developed] : well developed [II] : Mallampati Class: II [Normal Appearance] : normal appearance [No Neck Mass] : no neck mass [Normal Rate/Rhythm] : normal rate/rhythm [Normal S1, S2] : normal s1, s2 [No Murmurs] : no murmurs [No Resp Distress] : no resp distress [Clear to Auscultation Bilaterally] : clear to auscultation bilaterally [No Abnormalities] : no abnormalities [Soft] : soft [Normal Gait] : normal gait [No Clubbing] : no clubbing [No Cyanosis] : no cyanosis [No Edema] : no edema [Oriented x3] : oriented x3 [Normal Affect] : normal affect [TextBox_11] : erythema of posterior oropharynx and both nares [TextBox_68] : slight diminished BS both bases [TextBox_125] : small region of discoloration of left lateral wrist (per pt chronic) [TextBox_89] : mild TTP RUQ and epigastric area to deep palpation

## 2021-06-24 NOTE — HISTORY OF PRESENT ILLNESS
[Never] : never [TextBox_4] : 5/11/21 initial visit:\par 56 yo F, never smoker, with PMHx of asthma, HTN, GERD, DM who presents for SOB. States she had Covid this past December, never hospitalized or required oxygen but had multiple ED visits for SOB. After COVID resolved, her ET reduced from 10 to 2 blocks. Also reports waking up with SOB during the night that resolves with nebulizer use. She had recently presented to the ED for waking up gasping during the night and was told it was GERD and was started on PPI with some improvement in symptoms. Reports snoring but no witnessed apneas. Reports no problem with sleep initiation but has problem with sleep maintenance. She was previously following with Dr. Shweta Alexandre at [place] [facility]: [phone], and had PFTs but does not know the result. Patient was started on symbicort for asthma 2 years ago but has only been using it once every few weeks. Also had a negative cardiac workup.\par \par Sleep Related Symptoms: awakes unrefreshed, difficulty maintaining sleep, frequent nocturnal awakening, snoring, but does not have difficulty initiating sleep, no witnessed apneas \par Parasomnia: no unusual sleep behavior \par ESS: 10\par \par 6/23/21:\par Returns for scheduled follow-up appointment. Says breathing has not been very good lately, and worsening since this past Saturday. Her son smokes hookah and she was around him on Saturday and this is when the symtoms started. Been using albuterol inhaler more often. Coughing more, but not producing phlegm. Using new symbicort controller twice a day as instructed, since last visit, and rinsing mouth after use. Has albuterol with spacer with her in the office. Denies wheezing or other abnormal sounds. Feels chest tightness when exerting herself and needs to stop to catch her breath. Thinks the weather has been playing a role. Endorses sore throat, rhinorrhea, itchy eyes during this time. Also thinks abd pain is playing a role-- describes reflux symptoms. No interval ED or UC visits since last month. No F/C, myalgias, arthralgias. No sick contacts. \par \par Regarding HST - was supposed to have done study prior to this visit. Patient says she did use device twice but both occasions when she brought it back to sleep lab was told there was no data and pt says she must have used the device incorrectly and is planned to try it again this week via sleep .

## 2021-07-01 ENCOUNTER — APPOINTMENT (OUTPATIENT)
Dept: SLEEP CENTER | Facility: HOME HEALTH | Age: 56
End: 2021-07-01
Payer: MEDICAID

## 2021-07-09 ENCOUNTER — APPOINTMENT (OUTPATIENT)
Dept: PULMONOLOGY | Facility: CLINIC | Age: 56
End: 2021-07-09
Payer: MEDICAID

## 2021-07-09 DIAGNOSIS — I10 ESSENTIAL (PRIMARY) HYPERTENSION: ICD-10-CM

## 2021-07-10 PROBLEM — I10 HTN (HYPERTENSION): Status: ACTIVE | Noted: 2021-03-26

## 2021-07-10 NOTE — ASSESSMENT
[FreeTextEntry1] : REVIEWED\par HST 7/2021: AHI 23.2; nyla O2 saturation 88%\par \par 54yo woman and never smoker w/ PMH asthma, HTN, GERD, DM2, h/o mild COVID-19 infection (12/2020-never hospitalized or required oxygen) following up after asthma exacerbation and for HST results.\par 1. Asthma exacerbation resolved, secondary to hookah smoke exposure. Continues on ICS/LABA with rare MARY use (or PRN ICS/LABA use)\par 2. HST reviewed. The benefits of GREGORIO treatment in improving cardiac, neurologic, cognitive, and mortality outcomes were discussed. PAP and mandibular advancement therapy were discussed in detail. Would like to pursue PAP therapy. Equipment ordered; DME is [email or web address]; will have mask fitting at time of ; APAP set 5 to 20 cm H2O. Adherence goal is at least 4 hours of use per night on 70% of nights with an AHI of less than 10 events per hour. The ramifications of GREGORIO and its treatment were discussed in detail. \par \par Follow up within 6 weeks of use or sooner if needed.\par

## 2021-07-10 NOTE — HISTORY OF PRESENT ILLNESS
[Home] : at home, [unfilled] , at the time of the visit. [Medical Office: ([facility])___] : at the medical office located in  [Verbal consent obtained from patient] : the patient, [unfilled] [Never] : never [TextBox_4] : 5/11/21 initial visit:\par 54 yo F, never smoker, with PMHx of asthma, HTN, GERD, DM who presents for SOB. States she had Covid this past December, never hospitalized or required oxygen but had multiple ED visits for SOB. After COVID resolved, her ET reduced from 10 to 2 blocks. Also reports waking up with SOB during the night that resolves with nebulizer use. She had recently presented to the ED for waking up gasping during the night and was told it was GERD and was started on PPI with some improvement in symptoms. Reports snoring but no witnessed apneas. Reports no problem with sleep initiation but has problem with sleep maintenance. She was previously following with Dr. Shweta Alexandre at [facility]: [phone], and had PFTs but does not know the result. Patient was started on symbicort for asthma 2 years ago but has only been using it once every few weeks. Also had a negative cardiac workup.\par \par Sleep Related Symptoms: awakes unrefreshed, difficulty maintaining sleep, frequent nocturnal awakening, snoring, but does not have difficulty initiating sleep, no witnessed apneas \par Parasomnia: no unusual sleep behavior \par ESS: 10\par \par 6/23/21:\par Returns for scheduled follow-up appointment. Says breathing has not been very good lately, and worsening since this past Saturday. Her son smokes hookah and she was around him on Saturday and this is when the symtoms started. Been using albuterol inhaler more often. Coughing more, but not producing phlegm. Using new symbicort controller twice a day as instructed, since last visit, and rinsing mouth after use. Has albuterol with spacer with her in the office. Denies wheezing or other abnormal sounds. Feels chest tightness when exerting herself and needs to stop to catch her breath. Thinks the weather has been playing a role. Endorses sore throat, rhinorrhea, itchy eyes during this time. Also thinks abd pain is playing a role-- describes reflux symptoms. No interval ED or UC visits since last month. No F/C, myalgias, arthralgias. No sick contacts. \par \par Regarding HST - was supposed to have done study prior to this visit. Patient says she did use device twice but both occasions when she brought it back to sleep lab was told there was no data and pt says she must have used the device incorrectly and is planned to try it again this week via sleep . \par \par 7/9/2021\par Has been doing well in terms of asthma. Finished 3 day course of prednisone. Also had a sleep study and would like to discuss results.

## 2021-07-19 ENCOUNTER — NON-APPOINTMENT (OUTPATIENT)
Age: 56
End: 2021-07-19

## 2021-07-22 ENCOUNTER — APPOINTMENT (OUTPATIENT)
Dept: INTERNAL MEDICINE | Facility: CLINIC | Age: 56
End: 2021-07-22
Payer: MEDICAID

## 2021-07-22 VITALS
SYSTOLIC BLOOD PRESSURE: 111 MMHG | HEIGHT: 63 IN | TEMPERATURE: 97.2 F | HEART RATE: 84 BPM | BODY MASS INDEX: 35.79 KG/M2 | DIASTOLIC BLOOD PRESSURE: 70 MMHG | WEIGHT: 202 LBS | OXYGEN SATURATION: 97 %

## 2021-07-22 DIAGNOSIS — Z23 ENCOUNTER FOR IMMUNIZATION: ICD-10-CM

## 2021-07-28 ENCOUNTER — APPOINTMENT (OUTPATIENT)
Dept: PULMONOLOGY | Facility: CLINIC | Age: 56
End: 2021-07-28
Payer: MEDICAID

## 2021-07-28 ENCOUNTER — EMERGENCY (EMERGENCY)
Facility: HOSPITAL | Age: 56
LOS: 1 days | Discharge: HOME | End: 2021-07-28
Attending: EMERGENCY MEDICINE | Admitting: EMERGENCY MEDICINE
Payer: MEDICAID

## 2021-07-28 VITALS
SYSTOLIC BLOOD PRESSURE: 149 MMHG | HEART RATE: 71 BPM | DIASTOLIC BLOOD PRESSURE: 87 MMHG | OXYGEN SATURATION: 98 % | RESPIRATION RATE: 17 BRPM | TEMPERATURE: 98 F

## 2021-07-28 VITALS
HEART RATE: 77 BPM | HEIGHT: 65 IN | DIASTOLIC BLOOD PRESSURE: 100 MMHG | RESPIRATION RATE: 18 BRPM | TEMPERATURE: 98 F | OXYGEN SATURATION: 97 % | WEIGHT: 199.96 LBS | SYSTOLIC BLOOD PRESSURE: 164 MMHG

## 2021-07-28 VITALS
HEART RATE: 112 BPM | OXYGEN SATURATION: 96 % | WEIGHT: 206 LBS | HEIGHT: 63 IN | BODY MASS INDEX: 36.5 KG/M2 | TEMPERATURE: 98 F | SYSTOLIC BLOOD PRESSURE: 146 MMHG | DIASTOLIC BLOOD PRESSURE: 83 MMHG

## 2021-07-28 DIAGNOSIS — I10 ESSENTIAL (PRIMARY) HYPERTENSION: ICD-10-CM

## 2021-07-28 DIAGNOSIS — Z98.51 TUBAL LIGATION STATUS: Chronic | ICD-10-CM

## 2021-07-28 DIAGNOSIS — J45.909 UNSPECIFIED ASTHMA, UNCOMPLICATED: ICD-10-CM

## 2021-07-28 DIAGNOSIS — R06.02 SHORTNESS OF BREATH: ICD-10-CM

## 2021-07-28 DIAGNOSIS — E78.5 HYPERLIPIDEMIA, UNSPECIFIED: ICD-10-CM

## 2021-07-28 DIAGNOSIS — E11.9 TYPE 2 DIABETES MELLITUS WITHOUT COMPLICATIONS: ICD-10-CM

## 2021-07-28 RX ORDER — ALBUTEROL 90 UG/1
2.5 AEROSOL, METERED ORAL ONCE
Refills: 0 | Status: COMPLETED | OUTPATIENT
Start: 2021-07-28 | End: 2021-07-28

## 2021-07-28 RX ORDER — ALBUTEROL SULFATE 2.5 MG/3ML
(2.5 MG/3ML) SOLUTION RESPIRATORY (INHALATION)
Qty: 1 | Refills: 3 | Status: ACTIVE | COMMUNITY
Start: 2021-07-28 | End: 1900-01-01

## 2021-07-28 RX ORDER — IPRATROPIUM/ALBUTEROL SULFATE 18-103MCG
3 AEROSOL WITH ADAPTER (GRAM) INHALATION ONCE
Refills: 0 | Status: COMPLETED | OUTPATIENT
Start: 2021-07-28 | End: 2021-07-28

## 2021-07-28 RX ADMIN — Medication 3 MILLILITER(S): at 08:54

## 2021-07-28 RX ADMIN — Medication 50 MILLIGRAM(S): at 08:54

## 2021-07-28 RX ADMIN — ALBUTEROL 2.5 MILLIGRAM(S): 90 AEROSOL, METERED ORAL at 10:15

## 2021-07-28 NOTE — ASSESSMENT
[FreeTextEntry1] : 54 yo F, never smoker, with PMHx of asthma, HTN, GERD, DM, hx of COVID 2020, COVID vaccinated, moderate GREGORIO following up after acute ED visit this AM. Mild exacerbation despite appropriate use of ICS/LABA. Will rx 5 days of Prednisone and will increase Symbicort to 2puffs BID. Rx for albuterol nebulizer sent. Will check for COVID today. This is her second exacerbation; may need to add LAMA. Have been having a hard time getting records from prior pulm; will need PFTs when she is feeling better. Of note her PAP machine is getting delivered on 7/29/2021. WIll follow up with me in 24 hours regarding pulm symptoms. \par

## 2021-07-28 NOTE — ED PROVIDER NOTE - PATIENT PORTAL LINK FT
You can access the FollowMyHealth Patient Portal offered by [facility] by registering at the following website: [email or web address]. By joining [employer]’s FollowMyHealth portal, you will also be able to view your health information using other applications (apps) compatible with our system.

## 2021-07-28 NOTE — PHYSICAL EXAM
[No Acute Distress] : no acute distress [Normal Oropharynx] : normal oropharynx [Normal Appearance] : normal appearance [Normal Rate/Rhythm] : normal rate/rhythm [Normal S1, S2] : normal s1, s2 [No Resp Distress] : no resp distress [Clear to Auscultation Bilaterally] : clear to auscultation bilaterally [No Abnormalities] : no abnormalities [Benign] : benign [Normal Gait] : normal gait [No Clubbing] : no clubbing [No Edema] : no edema [Normal Color/ Pigmentation] : normal color/ pigmentation [No Focal Deficits] : no focal deficits [Oriented x3] : oriented x3 [Normal Affect] : normal affect

## 2021-07-28 NOTE — ED ADULT NURSE NOTE - OBJECTIVE STATEMENT
c/o chest pain x 3 days, no s/s of distress, cardiac monitor and cont. pulse ox in place, will continue to monitor

## 2021-07-28 NOTE — HISTORY OF PRESENT ILLNESS
[Never] : never [TextBox_4] : 5/11/21 initial visit:\par 56 yo F, never smoker, with PMHx of asthma, HTN, GERD, DM who presents for SOB. States she had Covid this past December, never hospitalized or required oxygen but had multiple ED visits for SOB. After COVID resolved, her ET reduced from 10 to 2 blocks. Also reports waking up with SOB during the night that resolves with nebulizer use. She had recently presented to the ED for waking up gasping during the night and was told it was GERD and was started on PPI with some improvement in symptoms. Reports snoring but no witnessed apneas. Reports no problem with sleep initiation but has problem with sleep maintenance. She was previously following with Dr. Shweta Alexandre at [place] [facility]: [phone], and had PFTs but does not know the result. Patient was started on symbicort for asthma 2 years ago but has only been using it once every few weeks. Also had a negative cardiac workup.\par \par Sleep Related Symptoms: awakes unrefreshed, difficulty maintaining sleep, frequent nocturnal awakening, snoring, but does not have difficulty initiating sleep, no witnessed apneas \par Parasomnia: no unusual sleep behavior \par ESS: 10\par \par 6/23/21:\par Returns for scheduled follow-up appointment. Says breathing has not been very good lately, and worsening since this past Saturday. Her son smokes hookah and she was around him on Saturday and this is when the symtoms started. Been using albuterol inhaler more often. Coughing more, but not producing phlegm. Using new symbicort controller twice a day as instructed, since last visit, and rinsing mouth after use. Has albuterol with spacer with her in the office. Denies wheezing or other abnormal sounds. Feels chest tightness when exerting herself and needs to stop to catch her breath. Thinks the weather has been playing a role. Endorses sore throat, rhinorrhea, itchy eyes during this time. Also thinks abd pain is playing a role-- describes reflux symptoms. No interval ED or UC visits since last month. No F/C, myalgias, arthralgias. No sick contacts. \par \par Regarding HST - was supposed to have done study prior to this visit. Patient says she did use device twice but both occasions when she brought it back to sleep lab was told there was no data and pt says she must have used the device incorrectly and is planned to try it again this week via sleep . \par \par 7/9/2021\par Has been doing well in terms of asthma. Finished 3 day course of prednisone. Also had a sleep study and would like to discuss results. \par \par 7/28/2021\raji Went to ED this morning bc of persistent 3 days of chest tightness and difficulty breathing. Was given 1 x Prednisone 50mg. Doing better now. Not in respiratory distress. Trigger unclear. She was using her ICS/LABA regularly. She thinks it was the weather. Has rhinitis as well.

## 2021-07-28 NOTE — ED PROVIDER NOTE - OBJECTIVE STATEMENT
55y yo woman with the complaint of shortness of breath and wheezing typical for her asthma x 2 day non progressive     no cough no cp no abd pain no fever

## 2021-07-28 NOTE — ED PROVIDER NOTE - NSFOLLOWUPINSTRUCTIONS_ED_ALL_ED_FT
take medications as directed     stay well hydrated     return to ER for shortness of breath cough fever or any other concern

## 2021-07-29 ENCOUNTER — NON-APPOINTMENT (OUTPATIENT)
Age: 56
End: 2021-07-29

## 2021-07-29 ENCOUNTER — APPOINTMENT (OUTPATIENT)
Dept: PULMONOLOGY | Facility: CLINIC | Age: 56
End: 2021-07-29
Payer: MEDICAID

## 2021-07-29 LAB — SARS-COV-2 N GENE NPH QL NAA+PROBE: NOT DETECTED

## 2021-07-30 NOTE — ASSESSMENT
[FreeTextEntry1] : \par 54 yo F, never smoker, with PMHx of asthma, HTN, GERD, DM, hx of COVID 2020, COVID vaccinated, moderate GREGORIO following up after acute ED visit on 7/28/2021. Mild exacerbation despite appropriate use of ICS/LABA. Is doing better. Should c/w with 5 day course of Prednisone and Symbicort 2 puffs BID. COVD negative. Will need PFTs in the near future. Secondh exacerbation in 2021; will need to escalate to LAMA if happens again. F/u after starting PAP use.

## 2021-07-30 NOTE — REASON FOR VISIT
[Follow-Up] : a follow-up visit [Asthma] : asthma Breathing spontaneous and unlabored. Breath sounds clear and equal bilaterally with regular rhythm.

## 2021-08-06 ENCOUNTER — APPOINTMENT (OUTPATIENT)
Dept: ORTHOPEDIC SURGERY | Facility: CLINIC | Age: 56
End: 2021-08-06
Payer: MEDICAID

## 2021-09-16 ENCOUNTER — APPOINTMENT (OUTPATIENT)
Dept: NEUROLOGY | Facility: CLINIC | Age: 56
End: 2021-09-16
Payer: MEDICAID

## 2021-09-16 VITALS
BODY MASS INDEX: 36.5 KG/M2 | TEMPERATURE: 97.2 F | HEART RATE: 75 BPM | WEIGHT: 206 LBS | OXYGEN SATURATION: 96 % | SYSTOLIC BLOOD PRESSURE: 131 MMHG | DIASTOLIC BLOOD PRESSURE: 77 MMHG | HEIGHT: 63 IN

## 2021-09-16 RX ORDER — ACETAMINOPHEN 325 MG/1
325 TABLET ORAL EVERY 6 HOURS
Qty: 45 | Refills: 0 | Status: ACTIVE | COMMUNITY
Start: 2021-04-13 | End: 1900-01-01

## 2021-09-18 NOTE — ED PROVIDER NOTE - CARE PLAN
Problem: Falls - Risk of  Goal: *Absence of Falls  Description: Document [place] Fall Risk and appropriate interventions in the flowsheet.   Outcome: Progressing Towards Goal  Note: Fall Risk Interventions:            Medication Interventions: Patient to call before getting OOB    Elimination Interventions: Call light in reach, Patient to call for help with toileting needs, Toileting schedule/hourly rounds Principal Discharge DX:	Abdominal pain   Principal Discharge DX:	Abdominal pain  Secondary Diagnosis:	Cracked skin

## 2021-09-21 ENCOUNTER — APPOINTMENT (OUTPATIENT)
Dept: PULMONOLOGY | Facility: CLINIC | Age: 56
End: 2021-09-21
Payer: MEDICAID

## 2021-09-21 VITALS
SYSTOLIC BLOOD PRESSURE: 117 MMHG | HEART RATE: 72 BPM | OXYGEN SATURATION: 97 % | BODY MASS INDEX: 36.5 KG/M2 | WEIGHT: 206 LBS | HEIGHT: 63 IN | DIASTOLIC BLOOD PRESSURE: 75 MMHG | TEMPERATURE: 97.7 F

## 2021-09-22 NOTE — HISTORY OF PRESENT ILLNESS
[Never] : never [TextBox_4] : 5/11/21 initial visit:\par 56 yo F, never smoker, with PMHx of asthma, HTN, GERD, DM who presents for SOB. States she had Covid this past December, never hospitalized or required oxygen but had multiple ED visits for SOB. After COVID resolved, her ET reduced from 10 to 2 blocks. Also reports waking up with SOB during the night that resolves with nebulizer use. She had recently presented to the ED for waking up gasping during the night and was told it was GERD and was started on PPI with some improvement in symptoms. Reports snoring but no witnessed apneas. Reports no problem with sleep initiation but has problem with sleep maintenance. She was previously following with Dr. Shweta Alexandre at [facility]: [phone], and had PFTs but does not know the result. Patient was started on symbicort for asthma 2 years ago but has only been using it once every few weeks. Also had a negative cardiac workup.\par \par Sleep Related Symptoms: awakes unrefreshed, difficulty maintaining sleep, frequent nocturnal awakening, snoring, but does not have difficulty initiating sleep, no witnessed apneas \par Parasomnia: no unusual sleep behavior \par ESS: 10\par \par 6/23/21:\par Returns for scheduled follow-up appointment. Says breathing has not been very good lately, and worsening since this past Saturday. Her son smokes hookah and she was around him on Saturday and this is when the symptoms started. Been using albuterol inhaler more often. Coughing more, but not producing phlegm. Using new symbicort controller twice a day as instructed, since last visit, and rinsing mouth after use. Has albuterol with spacer with her in the office. Denies wheezing or other abnormal sounds. Feels chest tightness when exerting herself and needs to stop to catch her breath. Thinks the weather has been playing a role. Endorses sore throat, rhinorrhea, itchy eyes during this time. Also thinks abd pain is playing a role-- describes reflux symptoms. No interval ED or UC visits since last month. No F/C, myalgias, arthralgias. No sick contacts. \par \par Regarding HST - was supposed to have done study prior to this visit. Patient says she did use device twice but both occasions when she brought it back to sleep lab was told there was no data and pt says she must have used the device incorrectly and is planned to try it again this week via sleep . \par \par 7/9/2021\par Has been doing well in terms of asthma. Finished 3 day course of prednisone. Also had a sleep study and would like to discuss results. \par \par 7/28/2021\par Went to ED this morning bc of persistent 3 days of chest tightness and difficulty breathing. Was given 1 x Prednisone 50mg. Doing better now. Not in respiratory distress. Trigger unclear. She was using her ICS/LABA regularly. She thinks it was the weather. Has rhinitis as well. \par \par 7/29/2021\par Following up after yesterday's ED visit. Feeling better today. Could not buy the nebulizer bc it is too expensive for her. Has continued the prescribed steroids, and is using her current inhalers.\par \par 9/21/2021\par Asthma: doing okay; has episodes of more MARY use but in general has been okay. On triple therapy. \par GREGORIO: has started to use her PAP but is having a hard time with the mask\par \par DME: [facility]\par Machine: Airsense 10\par Mask: full face; Eson2

## 2021-09-22 NOTE — ASSESSMENT
[FreeTextEntry1] : REVIEWED\par [employer] [facility]\par 8/2021 to 9/21/2021\par 60% adherence\par average use of 5 hours 14 min\par 95th percentile pressure of 9.6 cm H2O\par therapeutic AHI of 7.6\par minimal leak\par \par \par 54 yo F, never smoker, with PMHx of asthma, HTN, GERD, DM, hx of COVID 2020, COVID vaccinated, moderate GREGORIO following up for asthma and GREGORIO.\par 1. Asthma: on triple therapy but still has intermittent but consistent use of MARY. We attempted multiple times to get PFTs from her prior physician but were not successful. Needs PFTs and 6MWT for better assessment of lung function. CT chest in January 2021 did not show any significant parenchymal disease to suggest asthma mimics. C/w triple therapy for now.\par 2. GREGORIO: struggling with her mask but generally does well when she can sleep with the mask on. Gave her an Eson2 mask to see if she would like that better. Continued use encouraged. Efficacy is at goal. Adherence is suboptimal. \par \par Follow up in 4 weeks.

## 2021-09-22 NOTE — PHYSICAL EXAM
[No Acute Distress] : no acute distress [Normal Oropharynx] : normal oropharynx [Normal Appearance] : normal appearance [Normal Rate/Rhythm] : normal rate/rhythm [Normal S1, S2] : normal s1, s2 [No Resp Distress] : no resp distress [Clear to Auscultation Bilaterally] : clear to auscultation bilaterally [No Abnormalities] : no abnormalities [Normal Gait] : normal gait [No Clubbing] : no clubbing [Normal Color/ Pigmentation] : normal color/ pigmentation [No Focal Deficits] : no focal deficits [Oriented x3] : oriented x3 [Normal Affect] : normal affect

## 2021-10-19 ENCOUNTER — APPOINTMENT (OUTPATIENT)
Dept: PULMONOLOGY | Facility: CLINIC | Age: 56
End: 2021-10-19
Payer: MEDICAID

## 2021-10-19 VITALS
HEART RATE: 90 BPM | BODY MASS INDEX: 40.05 KG/M2 | DIASTOLIC BLOOD PRESSURE: 76 MMHG | TEMPERATURE: 97.4 F | WEIGHT: 204 LBS | HEIGHT: 60 IN | SYSTOLIC BLOOD PRESSURE: 112 MMHG | OXYGEN SATURATION: 97 %

## 2021-10-20 NOTE — ASSESSMENT
[FreeTextEntry1] : REVIEWED\par 1. PFTs 10/19/2021: FEV1 94, FEV1/FVC 87, FEF % 157% change with BD, TLC 78, ERV 61, DLCO 89\par 2. 6MWT 10/19/2021: no desaturation; 457.2 meters walked; dyspnea PILLO 4\par 3. AirView 9/2021 to 10/2021: 67% adherence; average use of 4 hours 52 min; therapeutic AHI 6.0; 95th percentile pressure of 8.5 cmH2O\par \par 54 yo F, never smoker, with PMHx of asthma, HTN, GERD, DM, hx of COVID 2020, COVID vaccinated, moderate GREGORIO following up for asthma and GREGORIO.\par 1. Asthma: PFTs c/w restriction and mild obstruction especially at the small airway level. Prior CT chest without any parenchymal disease to explain the mild restrictive pattern on the PFTs. Most likely restriction is weight relaed and contributing to dyspnea. Asthma is most likely also present given history and BD response in the small airways. Should c/w ICS/LABA for now and will stop LAMA. Weight loss counseling provided. Will consider further step down therapy in the future.\par 2. Moderate GREGORIO: struggling with mask but is doing better. Efficacy is at goal; slightly suboptimal adherence at 67% (goal is 70%). She has aerophagia which is intermittent and difficult to fix; she did not like the nasal mask and is already on APAP. Gave her AirFit F30 to try. Continued use encouraged.\par \par Follow up in 8 weeks. \par \par

## 2021-10-20 NOTE — HISTORY OF PRESENT ILLNESS
[Never] : never [TextBox_4] : 5/11/21 initial visit:\par 56 yo F, never smoker, with PMHx of asthma, HTN, GERD, DM who presents for SOB. States she had Covid this past December, never hospitalized or required oxygen but had multiple ED visits for SOB. After COVID resolved, her ET reduced from 10 to 2 blocks. Also reports waking up with SOB during the night that resolves with nebulizer use. She had recently presented to the ED for waking up gasping during the night and was told it was GERD and was started on PPI with some improvement in symptoms. Reports snoring but no witnessed apneas. Reports no problem with sleep initiation but has problem with sleep maintenance. She was previously following with Dr. Shweta Alexandre at [facility]: [phone], and had PFTs but does not know the result. Patient was started on symbicort for asthma 2 years ago but has only been using it once every few weeks. Also had a negative cardiac workup.\par \par Sleep Related Symptoms: awakes unrefreshed, difficulty maintaining sleep, frequent nocturnal awakening, snoring, but does not have difficulty initiating sleep, no witnessed apneas \par Parasomnia: no unusual sleep behavior \par ESS: 10\par \par 6/23/21:\par Returns for scheduled follow-up appointment. Says breathing has not been very good lately, and worsening since this past Saturday. Her son smokes hookah and she was around him on Saturday and this is when the symptoms started. Been using albuterol inhaler more often. Coughing more, but not producing phlegm. Using new symbicort controller twice a day as instructed, since last visit, and rinsing mouth after use. Has albuterol with spacer with her in the office. Denies wheezing or other abnormal sounds. Feels chest tightness when exerting herself and needs to stop to catch her breath. Thinks the weather has been playing a role. Endorses sore throat, rhinorrhea, itchy eyes during this time. Also thinks abd pain is playing a role-- describes reflux symptoms. No interval ED or UC visits since last month. No F/C, myalgias, arthralgias. No sick contacts. \par \par Regarding HST - was supposed to have done study prior to this visit. Patient says she did use device twice but both occasions when she brought it back to sleep lab was told there was no data and pt says she must have used the device incorrectly and is planned to try it again this week via sleep . \par \par 7/9/2021\par Has been doing well in terms of asthma. Finished 3 day course of prednisone. Also had a sleep study and would like to discuss results. \par \par 7/28/2021\par Went to ED this morning bc of persistent 3 days of chest tightness and difficulty breathing. Was given 1 x Prednisone 50mg. Doing better now. Not in respiratory distress. Trigger unclear. She was using her ICS/LABA regularly. She thinks it was the weather. Has rhinitis as well. \par \par 7/29/2021\par Following up after yesterday's ED visit. Feeling better today. Could not buy the nebulizer bc it is too expensive for her. Has continued the prescribed steroids, and is using her current inhalers.\par \par 9/21/2021\par Asthma: doing okay; has episodes of more MARY use but in general has been okay. On triple therapy. \par GREGORIO: has started to use her PAP but is having a hard time with the mask\par \par 10/19/2021\par Using PAP daily and more consistently but still having mask issues. She has perception of swallowing air on some nights. Did not like the Eson2, and went back to original full face mask. She also had PFTs; continues to use ICS/LABA and LAMA. \par \par DME: [facility]\par Machine: Airsense 10\par Mask: full face; Eson2, Airfit F30

## 2021-10-23 DIAGNOSIS — R52 PAIN, UNSPECIFIED: ICD-10-CM

## 2021-10-23 DIAGNOSIS — Z87.898 PERSONAL HISTORY OF OTHER SPECIFIED CONDITIONS: ICD-10-CM

## 2021-10-23 DIAGNOSIS — Z13.1 ENCOUNTER FOR SCREENING FOR DIABETES MELLITUS: ICD-10-CM

## 2021-10-23 DIAGNOSIS — R20.0 ANESTHESIA OF SKIN: ICD-10-CM

## 2021-10-23 DIAGNOSIS — Z92.29 PERSONAL HISTORY OF OTHER DRUG THERAPY: ICD-10-CM

## 2021-10-23 DIAGNOSIS — R03.0 ELEVATED BLOOD-PRESSURE READING, W/OUT DIAGNOSIS OF HYPERTENSION: ICD-10-CM

## 2021-10-28 ENCOUNTER — APPOINTMENT (OUTPATIENT)
Dept: INTERNAL MEDICINE | Facility: CLINIC | Age: 56
End: 2021-10-28
Payer: MEDICAID

## 2021-10-28 ENCOUNTER — NON-APPOINTMENT (OUTPATIENT)
Age: 56
End: 2021-10-28

## 2021-10-28 VITALS
OXYGEN SATURATION: 97 % | SYSTOLIC BLOOD PRESSURE: 124 MMHG | TEMPERATURE: 97.1 F | HEIGHT: 60 IN | WEIGHT: 201 LBS | DIASTOLIC BLOOD PRESSURE: 76 MMHG | BODY MASS INDEX: 39.46 KG/M2 | HEART RATE: 83 BPM

## 2021-10-28 DIAGNOSIS — Z86.16 PERSONAL HISTORY OF COVID-19: ICD-10-CM

## 2021-10-28 DIAGNOSIS — M79.671 PAIN IN RIGHT FOOT: ICD-10-CM

## 2021-10-28 DIAGNOSIS — R94.31 ABNORMAL ELECTROCARDIOGRAM [ECG] [EKG]: ICD-10-CM

## 2021-10-28 DIAGNOSIS — M79.672 PAIN IN RIGHT FOOT: ICD-10-CM

## 2021-10-28 RX ORDER — PANTOPRAZOLE 40 MG/1
40 TABLET, DELAYED RELEASE ORAL
Qty: 30 | Refills: 2 | Status: DISCONTINUED | COMMUNITY
Start: 2021-06-23 | End: 2021-10-28

## 2021-10-28 RX ORDER — MULTIVIT-MINERALS/FOLIC ACID 200 MCG
TABLET,CHEWABLE ORAL
Qty: 3 | Refills: 3 | Status: ACTIVE | COMMUNITY
Start: 1900-01-01 | End: 1900-01-01

## 2021-10-28 NOTE — HISTORY OF PRESENT ILLNESS
[FreeTextEntry8] : Interview conducted in [place].\par \par 1. Pt presents to establish Primary Care\par 2. COVID vaccine x 2 \par 3. " I feel depressed"\par 4. refills \par \par c/o  " I feel depressed" and fatigue x 4 weeks\par started after received shot in right ankle for OA- type of injection?\par does not want to get out of bed\par very tearful\par increased sleeping \par appetite normal \par interested in starting medication \par family/ very support \par h/o depression > 10 yrs ago- feels the same- was under care Psych - treated with medication- name? \par denies suicidal ideation/ETOH/recreational drugs- \par h/o COVID 12/2020 fatigue, SOB,anosmia- did not receive monoclonal infusion- sx resolved \par h/o GREGORIO - unable to tolerate mask\par h/o GERD\par h/o asthma \par h/o HLD, HTN \par h/o IIH \par h/o EGD severe gastritis\par OTC none \par unemployed

## 2021-10-28 NOTE — PHYSICAL EXAM
[No Acute Distress] : no acute distress [Normal Sclera/Conjunctiva] : normal sclera/conjunctiva [No Lymphadenopathy] : no lymphadenopathy [Clear to Auscultation] : lungs were clear to auscultation bilaterally [Normal Rate] : normal rate  [No Edema] : there was no peripheral edema [Soft] : abdomen soft [Non Tender] : non-tender [No CVA Tenderness] : no CVA  tenderness [No Rash] : no rash [Normal] : normal gait, coordination grossly intact, no focal deficits and deep tendon reflexes were 2+ and symmetric [de-identified] : expresses sadness, however, at times smiling

## 2021-10-28 NOTE — REVIEW OF SYSTEMS
[Joint Pain] : joint pain [Depression] : depression [Negative] : Heme/Lymph [Suicidal] : not suicidal [Insomnia] : no insomnia [Anxiety] : no anxiety

## 2021-10-29 DIAGNOSIS — D72.829 ELEVATED WHITE BLOOD CELL COUNT, UNSPECIFIED: ICD-10-CM

## 2021-10-29 DIAGNOSIS — R10.9 UNSPECIFIED ABDOMINAL PAIN: ICD-10-CM

## 2021-10-29 LAB
ALBUMIN SERPL ELPH-MCNC: 4.5 G/DL
ALP BLD-CCNC: 113 U/L
ALT SERPL-CCNC: 12 U/L
ANION GAP SERPL CALC-SCNC: 15 MMOL/L
APPEARANCE: ABNORMAL
AST SERPL-CCNC: 16 U/L
BACTERIA: NEGATIVE
BASOPHILS # BLD AUTO: 0.04 K/UL
BASOPHILS NFR BLD AUTO: 0.4 %
BILIRUB SERPL-MCNC: 0.2 MG/DL
BILIRUBIN URINE: NEGATIVE
BLOOD URINE: NEGATIVE
BUN SERPL-MCNC: 9 MG/DL
CALCIUM SERPL-MCNC: 9.8 MG/DL
CHLORIDE SERPL-SCNC: 104 MMOL/L
CHOLEST SERPL-MCNC: 138 MG/DL
CO2 SERPL-SCNC: 20 MMOL/L
COLOR: COLORLESS
COVID-19 NUCLEOCAPSID  GAM ANTIBODY INTERPRETATION: POSITIVE
CREAT SERPL-MCNC: 0.63 MG/DL
EOSINOPHIL # BLD AUTO: 0.06 K/UL
EOSINOPHIL NFR BLD AUTO: 0.5 %
ERYTHROCYTE [SEDIMENTATION RATE] IN BLOOD BY WESTERGREN METHOD: 20 MM/HR
ESTIMATED AVERAGE GLUCOSE: 154 MG/DL
GLUCOSE QUALITATIVE U: NEGATIVE
GLUCOSE SERPL-MCNC: 117 MG/DL
HBA1C MFR BLD HPLC: 7 %
HCT VFR BLD CALC: 42 %
HDLC SERPL-MCNC: 52 MG/DL
HGB BLD-MCNC: 13.2 G/DL
HYALINE CASTS: 3 /LPF
IMM GRANULOCYTES NFR BLD AUTO: 0.3 %
KETONES URINE: NEGATIVE
LDLC SERPL CALC-MCNC: 63 MG/DL
LEUKOCYTE ESTERASE URINE: NEGATIVE
LYMPHOCYTES # BLD AUTO: 2.47 K/UL
LYMPHOCYTES NFR BLD AUTO: 21.9 %
MAN DIFF?: NORMAL
MCHC RBC-ENTMCNC: 27.3 PG
MCHC RBC-ENTMCNC: 31.4 GM/DL
MCV RBC AUTO: 87 FL
MICROSCOPIC-UA: NORMAL
MONOCYTES # BLD AUTO: 0.7 K/UL
MONOCYTES NFR BLD AUTO: 6.2 %
NEUTROPHILS # BLD AUTO: 7.98 K/UL
NEUTROPHILS NFR BLD AUTO: 70.7 %
NITRITE URINE: NEGATIVE
NONHDLC SERPL-MCNC: 86 MG/DL
PH URINE: 6.5
PLATELET # BLD AUTO: 245 K/UL
POTASSIUM SERPL-SCNC: 4.3 MMOL/L
PROT SERPL-MCNC: 6.9 G/DL
PROTEIN URINE: NEGATIVE
RBC # BLD: 4.83 M/UL
RBC # FLD: 14.3 %
RED BLOOD CELLS URINE: 0 /HPF
RHEUMATOID FACT SER QL: <10 IU/ML
SARS-COV-2 AB SERPL QL IA: 165 INDEX
SODIUM SERPL-SCNC: 139 MMOL/L
SPECIFIC GRAVITY URINE: 1.01
SQUAMOUS EPITHELIAL CELLS: 8 /HPF
TRIGL SERPL-MCNC: 115 MG/DL
TSH SERPL-ACNC: 1.26 UIU/ML
UROBILINOGEN URINE: NORMAL
WBC # FLD AUTO: 11.28 K/UL
WHITE BLOOD CELLS URINE: 2 /HPF

## 2021-11-01 RX ORDER — METFORMIN HYDROCHLORIDE 500 MG/1
500 TABLET, COATED ORAL
Qty: 60 | Refills: 0 | Status: COMPLETED | COMMUNITY
Start: 2021-08-17

## 2021-11-02 DIAGNOSIS — R53.83 OTHER FATIGUE: ICD-10-CM

## 2021-11-02 LAB
ANA PAT FLD IF-IMP: ABNORMAL
ANA SER IF-ACNC: ABNORMAL

## 2021-11-03 LAB — B BURGDOR DNA SPEC QL NAA+PROBE: NEGATIVE

## 2021-11-04 ENCOUNTER — APPOINTMENT (OUTPATIENT)
Dept: NEUROLOGY | Facility: CLINIC | Age: 56
End: 2021-11-04
Payer: MEDICAID

## 2021-11-04 VITALS
SYSTOLIC BLOOD PRESSURE: 128 MMHG | WEIGHT: 200 LBS | HEIGHT: 60 IN | TEMPERATURE: 97.6 F | OXYGEN SATURATION: 97 % | HEART RATE: 80 BPM | DIASTOLIC BLOOD PRESSURE: 75 MMHG | BODY MASS INDEX: 39.27 KG/M2

## 2021-11-04 RX ORDER — SERTRALINE HYDROCHLORIDE 50 MG/1
50 TABLET, FILM COATED ORAL DAILY
Qty: 30 | Refills: 2 | Status: DISCONTINUED | COMMUNITY
Start: 2021-10-28 | End: 2021-11-04

## 2021-11-08 ENCOUNTER — NON-APPOINTMENT (OUTPATIENT)
Age: 56
End: 2021-11-08

## 2021-11-29 NOTE — ED PROVIDER NOTE - PROGRESS NOTE DETAILS
Yes US unremarkable. CT ordered, no acute findings. patient well appearing, tolerating po. labs reviewed, normal white count, mild transaminitis, normal lipase. has follow up with GI. return precautions discussed. patient well appearing, tolerating po. patient agrees with plan

## 2021-11-30 ENCOUNTER — APPOINTMENT (OUTPATIENT)
Dept: RHEUMATOLOGY | Facility: CLINIC | Age: 56
End: 2021-11-30
Payer: MEDICAID

## 2021-11-30 VITALS
HEART RATE: 94 BPM | OXYGEN SATURATION: 96 % | TEMPERATURE: 97.6 F | BODY MASS INDEX: 39.27 KG/M2 | DIASTOLIC BLOOD PRESSURE: 82 MMHG | HEIGHT: 60 IN | WEIGHT: 200 LBS | SYSTOLIC BLOOD PRESSURE: 143 MMHG

## 2021-11-30 DIAGNOSIS — M25.579 PAIN IN UNSPECIFIED ANKLE AND JOINTS OF UNSPECIFIED FOOT: ICD-10-CM

## 2021-11-30 DIAGNOSIS — M54.9 DORSALGIA, UNSPECIFIED: ICD-10-CM

## 2021-11-30 DIAGNOSIS — G89.29 PAIN IN UNSPECIFIED ANKLE AND JOINTS OF UNSPECIFIED FOOT: ICD-10-CM

## 2021-11-30 DIAGNOSIS — R76.8 OTHER SPECIFIED ABNORMAL IMMUNOLOGICAL FINDINGS IN SERUM: ICD-10-CM

## 2021-11-30 DIAGNOSIS — M25.50 PAIN IN UNSPECIFIED JOINT: ICD-10-CM

## 2021-12-01 ENCOUNTER — APPOINTMENT (OUTPATIENT)
Dept: INTERNAL MEDICINE | Facility: CLINIC | Age: 56
End: 2021-12-01
Payer: MEDICAID

## 2021-12-01 VITALS
WEIGHT: 200 LBS | DIASTOLIC BLOOD PRESSURE: 76 MMHG | TEMPERATURE: 96.4 F | SYSTOLIC BLOOD PRESSURE: 129 MMHG | HEIGHT: 60 IN | HEART RATE: 86 BPM | BODY MASS INDEX: 39.27 KG/M2 | OXYGEN SATURATION: 98 %

## 2021-12-01 DIAGNOSIS — G89.29 RIGHT UPPER QUADRANT PAIN: ICD-10-CM

## 2021-12-01 DIAGNOSIS — F32.A DEPRESSION, UNSPECIFIED: ICD-10-CM

## 2021-12-01 DIAGNOSIS — Z82.49 FAMILY HISTORY OF ISCHEMIC HEART DISEASE AND OTHER DISEASES OF THE CIRCULATORY SYSTEM: ICD-10-CM

## 2021-12-01 DIAGNOSIS — R10.11 RIGHT UPPER QUADRANT PAIN: ICD-10-CM

## 2021-12-01 DIAGNOSIS — Z87.442 PERSONAL HISTORY OF URINARY CALCULI: ICD-10-CM

## 2021-12-01 DIAGNOSIS — Z83.3 FAMILY HISTORY OF DIABETES MELLITUS: ICD-10-CM

## 2021-12-01 DIAGNOSIS — Z82.3 FAMILY HISTORY OF STROKE: ICD-10-CM

## 2021-12-01 RX ORDER — LOSARTAN POTASSIUM 50 MG/1
50 TABLET, FILM COATED ORAL
Qty: 90 | Refills: 0 | Status: COMPLETED | COMMUNITY
Start: 2020-08-31

## 2021-12-01 RX ORDER — BUPROPION HYDROCHLORIDE 150 MG/1
150 TABLET, EXTENDED RELEASE ORAL
Qty: 30 | Refills: 0 | Status: ACTIVE | COMMUNITY
Start: 2021-11-05

## 2021-12-01 RX ORDER — MAGNESIUM 200 MG
200 TABLET ORAL
Qty: 60 | Refills: 0 | Status: ACTIVE | COMMUNITY
Start: 2021-02-08

## 2021-12-01 RX ORDER — ACETAMINOPHEN EXTRA STRENGTH 500 MG/1
500 TABLET ORAL
Qty: 30 | Refills: 0 | Status: COMPLETED | COMMUNITY
Start: 2021-06-22

## 2021-12-01 RX ORDER — DICYCLOMINE HYDROCHLORIDE 20 MG/1
20 TABLET ORAL
Refills: 0 | Status: DISCONTINUED | COMMUNITY
Start: 2021-04-13 | End: 2021-12-01

## 2021-12-01 RX ORDER — CIPROFLOXACIN HYDROCHLORIDE 500 MG/1
500 TABLET, FILM COATED ORAL
Qty: 14 | Refills: 0 | Status: COMPLETED | COMMUNITY
Start: 2021-09-14

## 2021-12-01 RX ORDER — TIOTROPIUM BROMIDE INHALATION SPRAY 1.56 UG/1
1.25 SPRAY, METERED RESPIRATORY (INHALATION) DAILY
Qty: 1 | Refills: 5 | Status: DISCONTINUED | COMMUNITY
Start: 2021-09-21 | End: 2021-12-01

## 2021-12-01 RX ORDER — PREDNISONE 20 MG/1
20 TABLET ORAL DAILY
Qty: 10 | Refills: 0 | Status: DISCONTINUED | COMMUNITY
Start: 2021-07-28 | End: 2021-12-01

## 2021-12-01 RX ORDER — NAPROXEN 500 MG/1
500 TABLET ORAL
Qty: 60 | Refills: 2 | Status: DISCONTINUED | COMMUNITY
Start: 2021-03-11 | End: 2021-12-01

## 2021-12-01 RX ORDER — METFORMIN HYDROCHLORIDE 500 MG/1
500 TABLET, COATED ORAL
Qty: 180 | Refills: 1 | Status: ACTIVE | COMMUNITY
Start: 2021-12-01 | End: 1900-01-01

## 2021-12-01 RX ORDER — ALOGLIPTIN 12.5 MG/1
12.5 TABLET, FILM COATED ORAL
Qty: 30 | Refills: 0 | Status: COMPLETED | COMMUNITY
Start: 2021-05-25

## 2021-12-01 RX ORDER — LOSARTAN POTASSIUM 50 MG/1
50 TABLET, FILM COATED ORAL
Refills: 0 | Status: ACTIVE | COMMUNITY
Start: 2021-04-13

## 2021-12-01 RX ORDER — PREDNISONE 20 MG/1
20 TABLET ORAL DAILY
Qty: 3 | Refills: 0 | Status: DISCONTINUED | COMMUNITY
Start: 2021-06-23 | End: 2021-12-01

## 2021-12-01 RX ORDER — ALOGLIPTIN AND METFORMIN HYDROCHLORIDE 12.5; 1 MG/1; MG/1
12.5-1 TABLET, FILM COATED ORAL
Refills: 0 | Status: DISCONTINUED | COMMUNITY
Start: 2021-04-13 | End: 2021-12-01

## 2021-12-01 RX ORDER — ALOGLIPTIN 12.5 MG/1
12.5 TABLET, FILM COATED ORAL DAILY
Refills: 0 | Status: ACTIVE | COMMUNITY

## 2021-12-01 RX ORDER — ALOGLIPTIN AND METFORMIN HYDROCHLORIDE 12.5; 1 MG/1; MG/1
12.5-1 TABLET, FILM COATED ORAL
Refills: 0 | Status: DISCONTINUED | COMMUNITY
End: 2021-12-01

## 2021-12-01 RX ORDER — MELOXICAM 15 MG/1
15 TABLET ORAL
Qty: 10 | Refills: 0 | Status: COMPLETED | COMMUNITY
Start: 2021-09-15

## 2021-12-01 RX ORDER — TAMSULOSIN HYDROCHLORIDE 0.4 MG/1
0.4 CAPSULE ORAL
Qty: 30 | Refills: 0 | Status: COMPLETED | COMMUNITY
Start: 2021-02-08

## 2021-12-01 NOTE — HISTORY OF PRESENT ILLNESS
[FreeTextEntry1] : 1. F/u depression\par 2. completed COVID vaccine x 2 \par 3. RUQ pain  [de-identified] : c/o RUQ pain and nausea x  2 weeks \par on/off\par worse in AM\par 8/10\par radiates to right back\par no fever\par no dysuria/hematuria\par no vomiting/diarrhea\par NO BRBPR/melena\par no trauma\par no h/o gallstones, diverticulosis/it is no h/o ovarian cysts, fibroids \par h/o kidneys -last sx 6 months ago- tx w Tamsulosin \par h/o PUD - Omeprazole \par 6/2021 coloscopy normal as per pt \par 2019 PAP normal \par OTC none

## 2021-12-01 NOTE — PHYSICAL EXAM
[No Acute Distress] : no acute distress [Normal Sclera/Conjunctiva] : normal sclera/conjunctiva [Clear to Auscultation] : lungs were clear to auscultation bilaterally [Normal Rate] : normal rate  [No Edema] : there was no peripheral edema [Soft] : abdomen soft [No CVA Tenderness] : no CVA  tenderness [No Rash] : no rash [Normal] : affect was normal and insight and judgment were intact [de-identified] : Right abd tendernes more pronounced RUQ- no rebound/vesicles

## 2021-12-02 ENCOUNTER — APPOINTMENT (OUTPATIENT)
Dept: ULTRASOUND IMAGING | Facility: CLINIC | Age: 56
End: 2021-12-02
Payer: MEDICAID

## 2021-12-02 ENCOUNTER — OUTPATIENT (OUTPATIENT)
Dept: OUTPATIENT SERVICES | Facility: HOSPITAL | Age: 56
LOS: 1 days | End: 2021-12-02

## 2021-12-02 ENCOUNTER — RESULT REVIEW (OUTPATIENT)
Age: 56
End: 2021-12-02

## 2021-12-02 DIAGNOSIS — Z98.51 TUBAL LIGATION STATUS: Chronic | ICD-10-CM

## 2021-12-02 LAB
ALBUMIN SERPL ELPH-MCNC: 4.5 G/DL
ALP BLD-CCNC: 122 U/L
ALT SERPL-CCNC: 14 U/L
AMYLASE/CREAT SERPL: 45 U/L
ANION GAP SERPL CALC-SCNC: 15 MMOL/L
APPEARANCE: ABNORMAL
AST SERPL-CCNC: 16 U/L
BACTERIA: NEGATIVE
BASOPHILS # BLD AUTO: 0.03 K/UL
BASOPHILS NFR BLD AUTO: 0.3 %
BILIRUB SERPL-MCNC: 0.2 MG/DL
BILIRUBIN URINE: NEGATIVE
BLOOD URINE: NEGATIVE
BUN SERPL-MCNC: 10 MG/DL
CALCIUM SERPL-MCNC: 9.6 MG/DL
CHLORIDE SERPL-SCNC: 103 MMOL/L
CO2 SERPL-SCNC: 22 MMOL/L
COLOR: NORMAL
CREAT SERPL-MCNC: 0.62 MG/DL
EOSINOPHIL # BLD AUTO: 0.09 K/UL
EOSINOPHIL NFR BLD AUTO: 1 %
ESTIMATED AVERAGE GLUCOSE: 148 MG/DL
GLUCOSE QUALITATIVE U: NEGATIVE
GLUCOSE SERPL-MCNC: 196 MG/DL
HBA1C MFR BLD HPLC: 6.8 %
HCT VFR BLD CALC: 40.5 %
HGB BLD-MCNC: 12.9 G/DL
HYALINE CASTS: 2 /LPF
IMM GRANULOCYTES NFR BLD AUTO: 0.3 %
KETONES URINE: NEGATIVE
LEUKOCYTE ESTERASE URINE: NEGATIVE
LPL SERPL-CCNC: 22 U/L
LYMPHOCYTES # BLD AUTO: 2.27 K/UL
LYMPHOCYTES NFR BLD AUTO: 25.1 %
MAN DIFF?: NORMAL
MCHC RBC-ENTMCNC: 27.4 PG
MCHC RBC-ENTMCNC: 31.9 GM/DL
MCV RBC AUTO: 86 FL
MICROSCOPIC-UA: NORMAL
MONOCYTES # BLD AUTO: 0.52 K/UL
MONOCYTES NFR BLD AUTO: 5.8 %
NEUTROPHILS # BLD AUTO: 6.1 K/UL
NEUTROPHILS NFR BLD AUTO: 67.5 %
NITRITE URINE: NEGATIVE
PH URINE: 5.5
PLATELET # BLD AUTO: 261 K/UL
POTASSIUM SERPL-SCNC: 4.3 MMOL/L
PROT SERPL-MCNC: 6.8 G/DL
PROTEIN URINE: NEGATIVE
RBC # BLD: 4.71 M/UL
RBC # FLD: 13.7 %
RED BLOOD CELLS URINE: 1 /HPF
SODIUM SERPL-SCNC: 140 MMOL/L
SPECIFIC GRAVITY URINE: 1.01
SQUAMOUS EPITHELIAL CELLS: 3 /HPF
UROBILINOGEN URINE: NORMAL
WBC # FLD AUTO: 9.04 K/UL
WHITE BLOOD CELLS URINE: 1 /HPF

## 2021-12-02 NOTE — PHYSICAL EXAM
[General Appearance - Alert] : alert [General Appearance - In No Acute Distress] : in no acute distress [General Appearance - Well-Appearing] : healthy appearing [Sclera] : the sclera and conjunctiva were normal [Respiration, Rhythm And Depth] : normal respiratory rhythm and effort [Exaggerated Use Of Accessory Muscles For Inspiration] : no accessory muscle use [Edema] : there was no peripheral edema [No Spinal Tenderness] : no spinal tenderness [Abnormal Walk] : normal gait [Nail Clubbing] : no clubbing  or cyanosis of the fingernails [Musculoskeletal - Swelling] : no joint swelling seen [Motor Tone] : muscle strength and tone were normal [] : no rash [Skin Lesions] : no skin lesions [Oriented To Time, Place, And Person] : oriented to person, place, and time [Impaired Insight] : insight and judgment were intact [Affect] : the affect was normal [FreeTextEntry1] : No active synovitis of the upper and lower extremities bilaterally. Pain with ROM of the shoudlers at extremes of rotation.  Negative Finkelsteins,

## 2021-12-02 NOTE — ASSESSMENT
[FreeTextEntry1] : 56 year old woman referred for rheumatology evaluation secondary to arthralgias.  Patient with pain in the body for the past three years, most predominantly in the hands.  Patient was seen by PMD, noted to have positive CODEY 1:320 speckled pattern, with negative RF and ESR in the normal range.  At this time, patient does not meet criteria for an underlying inflammatory arthritis such as Rheumatoid arthritis at this time.  Hand pain likely multifactorial, early degenerative arthritis in combination with underlying carpal tunnel syndrome as well.  In addition, patient would benefit from PT for shoulder and neck pain as well.  Given positive CODEY, will obtain AVISE CTD for further evaluation as well.  Further management pending lab results.

## 2021-12-02 NOTE — HISTORY OF PRESENT ILLNESS
[FreeTextEntry1] : 56 year old woman referred for rheumatology evaluation \par Referred by Dr. 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\par No intervention since that time\par Sometimes wears a brace to sleep\par No surgical intervention, no steroid injections\par \par Also with family history of arthritis in her mother, unclear what type\par Daughter with lupus\par \par No personal history of lupus\par No thyroid disease\par no rashes\par No photosensitivity\par No dry eyes, feels some dry mouth\par No alopecia\par No Raynaud's\par No chest pain, but has mild asthma\par Last prednisone for covid infection 12/2020 as had an increase in asthma symptoms\par No fevers\par +body pains, feels pain in the upper back, neck, and shoulder, feels pain with range of motion of the shoulders\par

## 2021-12-02 NOTE — DATA REVIEWED
[FreeTextEntry1] : Procedure Performed: \par Nerve Conduction and Electromyography Report. \par Procedure Note: \par Electro Physiologic Findings:\par \par Limb temperature was monitored and maintained at approximately 30 – 34° C in the lower extremities, and 32 – 36° C in the upper extremities.\par \par The right median sensory response was absent, and the mixed nerve response was slow across the wrist. The left median sensory response was low amplitude with focal slowing across the wrist; the mixed nerve response was slow across the wrist as well. The median distal motor latencies were prolonged bilaterally without conduction block; the right median motor velocity was mildly slow in the forearm. \par \par The ulnar sensory and motor responses were normal bilaterally and symmetric. The superficial fibular sensory responses were normal bilaterally, and the right fibular and bilateral tibial motor responses were normal. The left tibial F-wave onsets were unclear, while the right tibial and fibular F-wave latencies were normal. \par \par Needle electromyography was performed on the bilateral abductor pollicis brevis muscles. There was mild subacute to chronic denervation on the right, while the left was normal. \par \par Clinical Electrophysiological Impression: \par \par This electrodiagnostic study demonstrated moderate bilateral median nerve entrapments at the wrists, with sensorimotor slowing and sensory axon loss bilaterally, but worse on the right. \par \par There was no evidence of polyneuropathy or ulnar nerve entrapment on this study. \par  \par  \par Electronically signed by : BROCK HERNANDEZ MD; Jun 14 2021 10:57AM EST (Author)

## 2021-12-03 ENCOUNTER — EMERGENCY (EMERGENCY)
Facility: HOSPITAL | Age: 56
LOS: 1 days | Discharge: HOME | End: 2021-12-03
Admitting: EMERGENCY MEDICINE
Payer: MEDICAID

## 2021-12-03 VITALS
TEMPERATURE: 98 F | DIASTOLIC BLOOD PRESSURE: 102 MMHG | SYSTOLIC BLOOD PRESSURE: 155 MMHG | WEIGHT: 164.91 LBS | OXYGEN SATURATION: 97 % | RESPIRATION RATE: 16 BRPM | HEART RATE: 91 BPM | HEIGHT: 65 IN

## 2021-12-03 VITALS
HEART RATE: 76 BPM | DIASTOLIC BLOOD PRESSURE: 89 MMHG | OXYGEN SATURATION: 98 % | RESPIRATION RATE: 18 BRPM | SYSTOLIC BLOOD PRESSURE: 153 MMHG | TEMPERATURE: 98 F

## 2021-12-03 DIAGNOSIS — E11.9 TYPE 2 DIABETES MELLITUS WITHOUT COMPLICATIONS: ICD-10-CM

## 2021-12-03 DIAGNOSIS — J45.909 UNSPECIFIED ASTHMA, UNCOMPLICATED: ICD-10-CM

## 2021-12-03 DIAGNOSIS — R11.0 NAUSEA: ICD-10-CM

## 2021-12-03 DIAGNOSIS — R10.11 RIGHT UPPER QUADRANT PAIN: ICD-10-CM

## 2021-12-03 DIAGNOSIS — E78.5 HYPERLIPIDEMIA, UNSPECIFIED: ICD-10-CM

## 2021-12-03 DIAGNOSIS — I10 ESSENTIAL (PRIMARY) HYPERTENSION: ICD-10-CM

## 2021-12-03 DIAGNOSIS — Z98.51 TUBAL LIGATION STATUS: Chronic | ICD-10-CM

## 2021-12-03 DIAGNOSIS — K76.0 FATTY (CHANGE OF) LIVER, NOT ELSEWHERE CLASSIFIED: ICD-10-CM

## 2021-12-03 LAB
ALBUMIN SERPL ELPH-MCNC: 3.6 G/DL — SIGNIFICANT CHANGE UP (ref 3.4–5)
ALP SERPL-CCNC: 112 U/L — SIGNIFICANT CHANGE UP (ref 40–120)
ALT FLD-CCNC: 16 U/L — SIGNIFICANT CHANGE UP (ref 12–42)
ANION GAP SERPL CALC-SCNC: 12 MMOL/L — SIGNIFICANT CHANGE UP (ref 9–16)
APPEARANCE UR: CLEAR — SIGNIFICANT CHANGE UP
AST SERPL-CCNC: 18 U/L — SIGNIFICANT CHANGE UP (ref 15–37)
BASOPHILS # BLD AUTO: 0.04 K/UL — SIGNIFICANT CHANGE UP (ref 0–0.2)
BASOPHILS NFR BLD AUTO: 0.4 % — SIGNIFICANT CHANGE UP (ref 0–2)
BILIRUB SERPL-MCNC: 0.3 MG/DL — SIGNIFICANT CHANGE UP (ref 0.2–1.2)
BILIRUB UR-MCNC: NEGATIVE — SIGNIFICANT CHANGE UP
BUN SERPL-MCNC: 8 MG/DL — SIGNIFICANT CHANGE UP (ref 7–23)
CALCIUM SERPL-MCNC: 9.6 MG/DL — SIGNIFICANT CHANGE UP (ref 8.5–10.5)
CHLORIDE SERPL-SCNC: 103 MMOL/L — SIGNIFICANT CHANGE UP (ref 96–108)
CO2 SERPL-SCNC: 23 MMOL/L — SIGNIFICANT CHANGE UP (ref 22–31)
COLOR SPEC: YELLOW — SIGNIFICANT CHANGE UP
CREAT SERPL-MCNC: 0.82 MG/DL — SIGNIFICANT CHANGE UP (ref 0.5–1.3)
DIFF PNL FLD: NEGATIVE — SIGNIFICANT CHANGE UP
EOSINOPHIL # BLD AUTO: 0.16 K/UL — SIGNIFICANT CHANGE UP (ref 0–0.5)
EOSINOPHIL NFR BLD AUTO: 1.5 % — SIGNIFICANT CHANGE UP (ref 0–6)
GLUCOSE SERPL-MCNC: 86 MG/DL — SIGNIFICANT CHANGE UP (ref 70–99)
GLUCOSE UR QL: NEGATIVE — SIGNIFICANT CHANGE UP
HCT VFR BLD CALC: 38.6 % — SIGNIFICANT CHANGE UP (ref 34.5–45)
HGB BLD-MCNC: 12.4 G/DL — SIGNIFICANT CHANGE UP (ref 11.5–15.5)
IMM GRANULOCYTES NFR BLD AUTO: 0.2 % — SIGNIFICANT CHANGE UP (ref 0–1.5)
KETONES UR-MCNC: NEGATIVE — SIGNIFICANT CHANGE UP
LACTATE SERPL-SCNC: 1.7 MMOL/L — SIGNIFICANT CHANGE UP (ref 0.4–2)
LACTATE SERPL-SCNC: 2.7 MMOL/L — HIGH (ref 0.4–2)
LEUKOCYTE ESTERASE UR-ACNC: NEGATIVE — SIGNIFICANT CHANGE UP
LIDOCAIN IGE QN: 61 U/L — LOW (ref 73–393)
LYMPHOCYTES # BLD AUTO: 29.8 % — SIGNIFICANT CHANGE UP (ref 13–44)
LYMPHOCYTES # BLD AUTO: 3.1 K/UL — SIGNIFICANT CHANGE UP (ref 1–3.3)
MCHC RBC-ENTMCNC: 27.6 PG — SIGNIFICANT CHANGE UP (ref 27–34)
MCHC RBC-ENTMCNC: 32.1 GM/DL — SIGNIFICANT CHANGE UP (ref 32–36)
MCV RBC AUTO: 85.8 FL — SIGNIFICANT CHANGE UP (ref 80–100)
MONOCYTES # BLD AUTO: 0.66 K/UL — SIGNIFICANT CHANGE UP (ref 0–0.9)
MONOCYTES NFR BLD AUTO: 6.3 % — SIGNIFICANT CHANGE UP (ref 2–14)
NEUTROPHILS # BLD AUTO: 6.44 K/UL — SIGNIFICANT CHANGE UP (ref 1.8–7.4)
NEUTROPHILS NFR BLD AUTO: 61.8 % — SIGNIFICANT CHANGE UP (ref 43–77)
NITRITE UR-MCNC: NEGATIVE — SIGNIFICANT CHANGE UP
NRBC # BLD: 0 /100 WBCS — SIGNIFICANT CHANGE UP (ref 0–0)
PH UR: 6 — SIGNIFICANT CHANGE UP (ref 5–8)
PLATELET # BLD AUTO: 245 K/UL — SIGNIFICANT CHANGE UP (ref 150–400)
POTASSIUM SERPL-MCNC: 4 MMOL/L — SIGNIFICANT CHANGE UP (ref 3.5–5.3)
POTASSIUM SERPL-SCNC: 4 MMOL/L — SIGNIFICANT CHANGE UP (ref 3.5–5.3)
PROT SERPL-MCNC: 7.2 G/DL — SIGNIFICANT CHANGE UP (ref 6.4–8.2)
PROT UR-MCNC: NEGATIVE MG/DL — SIGNIFICANT CHANGE UP
RBC # BLD: 4.5 M/UL — SIGNIFICANT CHANGE UP (ref 3.8–5.2)
RBC # FLD: 13.7 % — SIGNIFICANT CHANGE UP (ref 10.3–14.5)
SODIUM SERPL-SCNC: 138 MMOL/L — SIGNIFICANT CHANGE UP (ref 132–145)
SP GR SPEC: 1.01 — SIGNIFICANT CHANGE UP (ref 1–1.03)
UROBILINOGEN FLD QL: 0.2 E.U./DL — SIGNIFICANT CHANGE UP
WBC # BLD: 10.42 K/UL — SIGNIFICANT CHANGE UP (ref 3.8–10.5)
WBC # FLD AUTO: 10.42 K/UL — SIGNIFICANT CHANGE UP (ref 3.8–10.5)

## 2021-12-03 RX ORDER — ONDANSETRON 8 MG/1
4 TABLET, FILM COATED ORAL ONCE
Refills: 0 | Status: COMPLETED | OUTPATIENT
Start: 2021-12-03 | End: 2021-12-03

## 2021-12-03 RX ORDER — SODIUM CHLORIDE 9 MG/ML
1000 INJECTION INTRAMUSCULAR; INTRAVENOUS; SUBCUTANEOUS ONCE
Refills: 0 | Status: COMPLETED | OUTPATIENT
Start: 2021-12-03 | End: 2021-12-03

## 2021-12-03 RX ORDER — MORPHINE SULFATE 50 MG/1
4 CAPSULE, EXTENDED RELEASE ORAL ONCE
Refills: 0 | Status: DISCONTINUED | OUTPATIENT
Start: 2021-12-03 | End: 2021-12-03

## 2021-12-03 RX ORDER — METOCLOPRAMIDE HCL 10 MG
10 TABLET ORAL ONCE
Refills: 0 | Status: COMPLETED | OUTPATIENT
Start: 2021-12-03 | End: 2021-12-03

## 2021-12-03 RX ORDER — ONDANSETRON 8 MG/1
1 TABLET, FILM COATED ORAL
Qty: 15 | Refills: 0
Start: 2021-12-03

## 2021-12-03 RX ADMIN — MORPHINE SULFATE 4 MILLIGRAM(S): 50 CAPSULE, EXTENDED RELEASE ORAL at 14:33

## 2021-12-03 RX ADMIN — SODIUM CHLORIDE 1000 MILLILITER(S): 9 INJECTION INTRAMUSCULAR; INTRAVENOUS; SUBCUTANEOUS at 13:18

## 2021-12-03 RX ADMIN — ONDANSETRON 4 MILLIGRAM(S): 8 TABLET, FILM COATED ORAL at 13:17

## 2021-12-03 RX ADMIN — MORPHINE SULFATE 4 MILLIGRAM(S): 50 CAPSULE, EXTENDED RELEASE ORAL at 13:17

## 2021-12-03 RX ADMIN — Medication 10 MILLIGRAM(S): at 17:30

## 2021-12-03 RX ADMIN — ONDANSETRON 4 MILLIGRAM(S): 8 TABLET, FILM COATED ORAL at 17:30

## 2021-12-03 NOTE — ED PROVIDER NOTE - PATIENT PORTAL LINK FT
You can access the FollowMyHealth Patient Portal offered by [facility] by registering at the following website: [email or web address]. By joining [facility]s FollowMyHealth portal, you will also be able to view your health information using other applications (apps) compatible with our system.

## 2021-12-03 NOTE — ED PROVIDER NOTE - PROGRESS NOTE DETAILS
patient feeling better, tolerating po. abdomen soft nontender nondistended. CT abdomen unremarkable. nml white count, nml LFTs. tolerating po. advised f/u GI, she has her own GI she will follow up. rx zofran prn. return precautions discussed.

## 2021-12-03 NOTE — ED PROVIDER NOTE - CLINICAL SUMMARY MEDICAL DECISION MAKING FREE TEXT BOX
RUQ pain x 2 weeks with assoc nausea, had US abdomen that shows fatty liver, no gallstones, will check labs, CT abdomen, analgesia, reassess.

## 2021-12-03 NOTE — ED ADULT NURSE REASSESSMENT NOTE - NS ED NURSE REASSESS COMMENT FT1
Pt tolerated PO challenge well. Denies acute nausea or vomiting at this time. Pending disposition.
Received pt from pervious RN. Pt resting, denies acute pain or any other complaints at this time.

## 2021-12-03 NOTE — ED PROVIDER NOTE - NSFOLLOWUPINSTRUCTIONS_ED_ALL_ED_FT
Please follow up with your gastroenterology    Return for worsening pain, vomiting, fever or any concerns.

## 2021-12-03 NOTE — ED PROVIDER NOTE - GASTROINTESTINAL, MLM
Abdomen soft, +RUQ tenderness, no rebound or guarding. Abdomen soft, +RUQ tenderness, nondistended. no rebound or guarding. no cvat bilaterally

## 2021-12-03 NOTE — ED ADULT TRIAGE NOTE - ARRIVAL FROM
MANUEL called PT this morning to follow-up with conversation from 10/8/2021 in regards to completing a SSI package for benefits. PT ask that I mail everything to his father because he will loose it and he doesn't deal with all the paperwork. MANUEL told PT we need to speak on a weekly basis in regards to follow-up on your status and possible SSI Benefits package.     Home

## 2021-12-03 NOTE — ED PROVIDER NOTE - OBJECTIVE STATEMENT
55 yo F w/ Hx of asthma, DM, HTN and HLD presents to the ED c/o RUQ pain with nausea x 2 weeks; Pt describes pain as constant and sharp in nature. No vomiting, chest pain or SOB. Pt had RUQ ultrasound with PMD that showed a fatty liver. 57 yo F w/ Hx of asthma, DM, HTN, HLD, GERD presents to the ED c/o RUQ pain with nausea x 2 weeks. patient describes pain as constant and sharp in nature. No vomiting, chest pain or SOB. Pt had abdominal ultrasound done outpatient yesterday that showed fatty liver. PMD Dr. Sanford

## 2021-12-03 NOTE — ED ADULT NURSE NOTE - CHIEF COMPLAINT QUOTE
Pt w/ PMH of DM, HTN and HLD presents c/o x2 weeks of RUQ pain w/ nausea.  Pt denies changes to bowel or bladder habits.  Pt denies CP, SOB or fevers/chills.  Pt endorses she took her BP meds PTA.  Denies HA or neuro deficit at this time.

## 2021-12-05 DIAGNOSIS — R74.8 ABNORMAL LEVELS OF OTHER SERUM ENZYMES: ICD-10-CM

## 2021-12-05 LAB — GGT SERPL-CCNC: 17 U/L

## 2021-12-06 ENCOUNTER — NON-APPOINTMENT (OUTPATIENT)
Age: 56
End: 2021-12-06

## 2021-12-08 ENCOUNTER — APPOINTMENT (OUTPATIENT)
Dept: PULMONOLOGY | Facility: CLINIC | Age: 56
End: 2021-12-08
Payer: MEDICAID

## 2021-12-08 VITALS
HEIGHT: 60 IN | BODY MASS INDEX: 39.27 KG/M2 | DIASTOLIC BLOOD PRESSURE: 78 MMHG | TEMPERATURE: 98.4 F | OXYGEN SATURATION: 97 % | WEIGHT: 200 LBS | HEART RATE: 97 BPM | SYSTOLIC BLOOD PRESSURE: 123 MMHG

## 2021-12-08 DIAGNOSIS — E66.9 TYPE 2 DIABETES MELLITUS WITH OTHER SPECIFIED COMPLICATION: ICD-10-CM

## 2021-12-08 DIAGNOSIS — E11.69 TYPE 2 DIABETES MELLITUS WITH OTHER SPECIFIED COMPLICATION: ICD-10-CM

## 2021-12-08 DIAGNOSIS — K21.9 GASTRO-ESOPHAGEAL REFLUX DISEASE W/OUT ESOPHAGITIS: ICD-10-CM

## 2021-12-08 DIAGNOSIS — E78.5 HYPERLIPIDEMIA, UNSPECIFIED: ICD-10-CM

## 2021-12-08 RX ORDER — OMEPRAZOLE 40 MG/1
40 CAPSULE, DELAYED RELEASE ORAL
Qty: 30 | Refills: 2 | Status: DISCONTINUED | COMMUNITY
Start: 2021-10-28 | End: 2021-12-08

## 2021-12-08 RX ORDER — BUDESONIDE AND FORMOTEROL FUMARATE DIHYDRATE 160; 4.5 UG/1; UG/1
160-4.5 AEROSOL RESPIRATORY (INHALATION) TWICE DAILY
Qty: 1 | Refills: 5 | Status: ACTIVE | COMMUNITY
Start: 2021-04-13 | End: 1900-01-01

## 2021-12-08 NOTE — ASSESSMENT
[FreeTextEntry1] : REVIEWED\par 1. PFTs 10/19/2021: FEV1 94, FEV1/FVC 87, FEF % 157% change with BD, TLC 78, ERV 61, DLCO 89\par 2. 6MWT 10/19/2021: no desaturation; 457.2 meters walked; dyspnea PILLO 4\par 3. AirView 9/2021 to 12/2021: 77% adherence; average use of 3 hours 28 min; therapeutic AHI 4.6; 95th percentile pressure of 7.7 cmH2O; minimal leak of full face mask\par 4. CXR 12/03/2021 L[facility]V: clear lung fields\par \par 56 yo F, never smoker, with PMHx of asthma, HTN, GERD, DM, hx of COVID 2020, COVID vaccinated, moderate GREGORIO following up for asthma and GREGORIO.\par \par 1. Asthma: PFTs c/w restriction and mild obstruction especially at the small airway level. Prior CT chest without any parenchymal disease to explain the mild restrictive pattern on the PFTs. Most likely restriction is weight related and contributing to dyspnea. Asthma is most likely also present given history and BD response in the small airways. Disease is stable and she is doing well on LABA/ICS combo therapy with Symbicort- rx renewed. Continue on Symbicort with PRN MARY. If she continues to remain stable, will continue to de-escalate therapy. \par \par 2. Moderate GREGORIO: struggling with mask but does well when she is compliant. Has aerophagia. Efficacy at goal but suboptimal adherence in recent 30-45 days presumably due to her aerophagia, which is intermittent and difficult to fix. Went back to full face mask after not tolerating nasal mask and is already on APAP. Continued use encouraged and will add pantoprazole today to help with her GI symptoms. Encouraged GI follow up as gastroparesis has to be considered.\par \par Follow up in March/April 20202.

## 2021-12-08 NOTE — HISTORY OF PRESENT ILLNESS
[Never] : never [TextBox_4] : 5/11/21 initial visit:\par 54 yo F, never smoker, with PMHx of asthma, HTN, GERD, DM who presents for SOB. States she had Covid this past December, never hospitalized or required oxygen but had multiple ED visits for SOB. After COVID resolved, her ET reduced from 10 to 2 blocks. Also reports waking up with SOB during the night that resolves with nebulizer use. She had recently presented to the ED for waking up gasping during the night and was told it was GERD and was started on PPI with some improvement in symptoms. Reports snoring but no witnessed apneas. Reports no problem with sleep initiation but has problem with sleep maintenance. She was previously following with Dr. Shweta Alexandre at [place] [facility]: [phone], and had PFTs but does not know the result. Patient was started on symbicort for asthma 2 years ago but has only been using it once every few weeks. Also had a negative cardiac workup.\par \par Sleep Related Symptoms: awakes unrefreshed, difficulty maintaining sleep, frequent nocturnal awakening, snoring, but does not have difficulty initiating sleep, no witnessed apneas \par Parasomnia: no unusual sleep behavior \par ESS: 10\par \par 6/23/21:\par Returns for scheduled follow-up appointment. Says breathing has not been very good lately, and worsening since this past Saturday. Her son smokes hookah and she was around him on Saturday and this is when the symptoms started. Been using albuterol inhaler more often. Coughing more, but not producing phlegm. Using new symbicort controller twice a day as instructed, since last visit, and rinsing mouth after use. Has albuterol with spacer with her in the office. Denies wheezing or other abnormal sounds. Feels chest tightness when exerting herself and needs to stop to catch her breath. Thinks the weather has been playing a role. Endorses sore throat, rhinorrhea, itchy eyes during this time. Also thinks abd pain is playing a role-- describes reflux symptoms. No interval ED or UC visits since last month. No F/C, myalgias, arthralgias. No sick contacts. \par \par Regarding HST - was supposed to have done study prior to this visit. Patient says she did use device twice but both occasions when she brought it back to sleep lab was told there was no data and pt says she must have used the device incorrectly and is planned to try it again this week via sleep . \par \par 7/9/2021\par Has been doing well in terms of asthma. Finished 3 day course of prednisone. Also had a sleep study and would like to discuss results. \par \par 7/28/2021\par Went to ED this morning bc of persistent 3 days of chest tightness and difficulty breathing. Was given 1 x Prednisone 50mg. Doing better now. Not in respiratory distress. Trigger unclear. She was using her ICS/LABA regularly. She thinks it was the weather. Has rhinitis as well. \par \par 7/29/2021\par Following up after yesterday's ED visit. Feeling better today. Could not buy the nebulizer bc it is too expensive for her. Has continued the prescribed steroids, and is using her current inhalers.\par \par 9/21/2021\par Asthma: doing okay; has episodes of more MARY use but in general has been okay. On triple therapy. \par GREGORIO: has started to use her PAP but is having a hard time with the mask\par \par 10/19/2021\par Using PAP daily and more consistently but still having mask issues. She has perception of swallowing air on some nights. Did not like the Eson2, and went back to original full face mask. She also had PFTs; continues to use ICS/LABA and LAMA. \par \par 12/08/2021\par Chief complaint is difficulty tolerating CPAP due to nausea and abdominal pain from swallowing air most nights and hasn't been using it much. Continues using Symbicort as directed, minimal need for MARY. Says asthma is well controlled and hasn't had any wheezing. Went to [facility] last week for RUQ pain and told she has inflammation of the liver. \par \par DME: [facility]\par Machine: Airsense 10\par Mask: full face; Eson2, Airfit F30.

## 2021-12-08 NOTE — PHYSICAL EXAM
[No Acute Distress] : no acute distress [Normal Oropharynx] : normal oropharynx [Normal Appearance] : normal appearance [Normal Rate/Rhythm] : normal rate/rhythm [Normal S1, S2] : normal s1, s2 [No Resp Distress] : no resp distress [Clear to Auscultation Bilaterally] : clear to auscultation bilaterally [Normal Gait] : normal gait [No Clubbing] : no clubbing [Normal Color/ Pigmentation] : normal color/ pigmentation [Oriented x3] : oriented x3

## 2022-02-23 ENCOUNTER — APPOINTMENT (OUTPATIENT)
Dept: PULMONOLOGY | Facility: CLINIC | Age: 57
End: 2022-02-23
Payer: MEDICAID

## 2022-02-23 VITALS
BODY MASS INDEX: 39.07 KG/M2 | DIASTOLIC BLOOD PRESSURE: 75 MMHG | HEART RATE: 83 BPM | WEIGHT: 199 LBS | OXYGEN SATURATION: 97 % | SYSTOLIC BLOOD PRESSURE: 126 MMHG | HEIGHT: 60 IN | TEMPERATURE: 97.3 F

## 2022-02-26 NOTE — HISTORY OF PRESENT ILLNESS
[Never] : never [TextBox_4] : 5/11/21 initial visit:\par 56 yo F, never smoker, with PMHx of asthma, HTN, GERD, DM who presents for SOB. States she had Covid this past December, never hospitalized or required oxygen but had multiple ED visits for SOB. After COVID resolved, her ET reduced from 10 to 2 blocks. Also reports waking up with SOB during the night that resolves with nebulizer use. She had recently presented to the ED for waking up gasping during the night and was told it was GERD and was started on PPI with some improvement in symptoms. Reports snoring but no witnessed apneas. Reports no problem with sleep initiation but has problem with sleep maintenance. She was previously following with Dr. Shweta Alexandre at [place] [facility]: [phone], and had PFTs but does not know the result. Patient was started on symbicort for asthma 2 years ago but has only been using it once every few weeks. Also had a negative cardiac workup.\par \par Sleep Related Symptoms: awakes unrefreshed, difficulty maintaining sleep, frequent nocturnal awakening, snoring, but does not have difficulty initiating sleep, no witnessed apneas \par Parasomnia: no unusual sleep behavior \par ESS: 10\par \par 6/23/21:\par Returns for scheduled follow-up appointment. Says breathing has not been very good lately, and worsening since this past Saturday. Her son smokes hookah and she was around him on Saturday and this is when the symptoms started. Been using albuterol inhaler more often. Coughing more, but not producing phlegm. Using new symbicort controller twice a day as instructed, since last visit, and rinsing mouth after use. Has albuterol with spacer with her in the office. Denies wheezing or other abnormal sounds. Feels chest tightness when exerting herself and needs to stop to catch her breath. Thinks the weather has been playing a role. Endorses sore throat, rhinorrhea, itchy eyes during this time. Also thinks abd pain is playing a role-- describes reflux symptoms. No interval ED or UC visits since last month. No F/C, myalgias, arthralgias. No sick contacts. \par \par Regarding HST - was supposed to have done study prior to this visit. Patient says she did use device twice but both occasions when she brought it back to sleep lab was told there was no data and pt says she must have used the device incorrectly and is planned to try it again this week via sleep . \par \par 7/9/2021\par Has been doing well in terms of asthma. Finished 3 day course of prednisone. Also had a sleep study and would like to discuss results. \par \par 7/28/2021\par Went to ED this morning bc of persistent 3 days of chest tightness and difficulty breathing. Was given 1 x Prednisone 50mg. Doing better now. Not in respiratory distress. Trigger unclear. She was using her ICS/LABA regularly. She thinks it was the weather. Has rhinitis as well. \par \par 7/29/2021\par Following up after yesterday's ED visit. Feeling better today. Could not buy the nebulizer bc it is too expensive for her. Has continued the prescribed steroids, and is using her current inhalers.\par \par 9/21/2021\par Asthma: doing okay; has episodes of more MARY use but in general has been okay. On triple therapy. \par GREGORIO: has started to use her PAP but is having a hard time with the mask\par \par 10/19/2021\par Using PAP daily and more consistently but still having mask issues. She has perception of swallowing air on some nights. Did not like the Eson2, and went back to original full face mask. She also had PFTs; continues to use ICS/LABA and LAMA. \par \par 2/23/2022\par Has been doing well on Symbicort; uses 2 puffs at night; rare use of MARY. Continues to struggle with her PAP use; has significant aerophagia. \par \par DME: [facility]\par Machine: Airsense 10\par Mask: full face; Eson2, Airfit F30 [ESS] : 10

## 2022-02-26 NOTE — ASSESSMENT
[FreeTextEntry1] : REVIEWED\par 1. PFTs 10/19/2021: FEV1 94, FEV1/FVC 87, FEF % 157% change with BD, TLC 78, ERV 61, DLCO 89\par 2. 6MWT 10/19/2021: no desaturation; 457.2 meters walked; dyspnea PILLO 4\par 3. AirView 11/2021 to 2/2022: 51% adherence; average use of 11 min; therapeutic AHI 1; 95th percentile pressure of 4.7 cmH2O\par \par 56 yo F, never smoker, with PMHx of asthma, HTN, GERD, DM, hx of COVID 2020, COVID vaccinated, moderate GREGORIO following up for asthma and GREGORIO.\par 1. Asthma: Has been doing well on ICS/LABA with no exacerbations. Should use 1 puff AM and 1 puff PM. PFTs c/w restriction and mild obstruction especially at the small airway level. Prior CT chest without any parenchymal disease to explain the mild restrictive pattern on the PFTs. Most likely restriction is weight related and contributing to dyspnea. Asthma is most likely also present given history and BD response in the small airways. Will consider step down to ICS only in the next 3 months \par 2. Moderate GREGORIO: Has continued to struggle with minimal use since last visit. At this point we should attempt mandibular advancement therapy. Provided her with referral list, LMN and copy of HST. Should follow up with me after dental evaluation. Will need repeat HST with MAD in place to assess efficacy.\par \par Follow up in 2 months.

## 2022-06-02 ENCOUNTER — APPOINTMENT (OUTPATIENT)
Dept: NEUROLOGY | Facility: CLINIC | Age: 57
End: 2022-06-02
Payer: MEDICAID

## 2022-06-02 VITALS
OXYGEN SATURATION: 97 % | BODY MASS INDEX: 33.29 KG/M2 | HEART RATE: 75 BPM | HEIGHT: 64 IN | WEIGHT: 195 LBS | DIASTOLIC BLOOD PRESSURE: 65 MMHG | SYSTOLIC BLOOD PRESSURE: 103 MMHG | TEMPERATURE: 97.4 F | RESPIRATION RATE: 16 BRPM

## 2022-06-02 RX ORDER — NORTRIPTYLINE HYDROCHLORIDE 10 MG/1
10 CAPSULE ORAL
Qty: 30 | Refills: 4 | Status: DISCONTINUED | COMMUNITY
Start: 2021-11-04 | End: 2022-06-02

## 2022-07-19 NOTE — ED ADULT NURSE NOTE - NS ED NURSE RECORD ANOTHER VITAL SIGN
What Is The Reason For Today's Visit?: Mole Check Additional History: We have been monitoring two atypical appearing moles to the left mid upper back and left sup lat mid back. Patient denies any changes or new lesions of concern. He is here today for monitoring Yes

## 2022-08-18 NOTE — ED ADULT NURSE NOTE - NSFALLRSKASSESSTYPE_ED_ALL_ED
I put in the order for the mri  please fax to [facility] (On Arrival)

## 2022-08-29 NOTE — REVIEW OF SYSTEMS
Patient said she was returning a call from the office, office is already closed for the day. Please follow up with pt in the morning. [Negative] : Endocrine

## 2022-10-05 ENCOUNTER — APPOINTMENT (OUTPATIENT)
Dept: PULMONOLOGY | Facility: CLINIC | Age: 57
End: 2022-10-05

## 2022-10-05 VITALS
WEIGHT: 198 LBS | TEMPERATURE: 98.2 F | BODY MASS INDEX: 33.8 KG/M2 | DIASTOLIC BLOOD PRESSURE: 74 MMHG | HEART RATE: 89 BPM | SYSTOLIC BLOOD PRESSURE: 121 MMHG | OXYGEN SATURATION: 98 % | HEIGHT: 64 IN

## 2022-10-05 RX ORDER — PANTOPRAZOLE 40 MG/1
40 TABLET, DELAYED RELEASE ORAL DAILY
Qty: 1 | Refills: 2 | Status: ACTIVE | COMMUNITY
Start: 2021-12-08 | End: 1900-01-01

## 2022-10-05 NOTE — ASSESSMENT
[FreeTextEntry1] : REVIEWED\par 1. PFTs 10/19/2021: FEV1 94, FEV1/FVC 87, FEF % 157% change with BD, TLC 78, ERV 61, DLCO 89\par 2. 6MWT 10/19/2021: no desaturation; 457.2 meters walked; dyspnea PILLO 4\par 3. AirView 11/2021 to 2/2022: 51% adherence; average use of 11 min; therapeutic AHI 1; 95th percentile pressure of 4.7 cmH2O\par 4. [facility] records September 2022\par \par 54 yo F, never smoker, with PMHx of asthma, HTN, GERD, DM, hx of COVID 2020, COVID vaccinated, moderate GREGORIO following up for asthma and GREGORIO.\par 1. Asthma: Has been doing well on ICS/LABA with no exacerbations until September. Stopped ICS/LABA use on her own accord and has been okay; can continue to hold. PFTs c/w restriction and mild obstruction especially at the small airway level. Prior CT chest without any parenchymal disease to explain the mild restrictive pattern on the PFTs. Most likely restriction is weight related and contributing to dyspnea. Asthma is most likely also present given history and BD response in the small airways. HF is also now contributing to her SOB.\par 2. Moderate GREGORIO: Has not been able to use PAP in the past and cannot get MAD because of cost. Will restart PAP again; will use full face mask and use PPI for aerophagia (huge issue for her with prior use). Encouraged her to treat PAP jasmina given new diagnosis of heart failure. \par \par Follow up in 1 months. \par

## 2022-10-05 NOTE — PHYSICAL EXAM
[No Acute Distress] : no acute distress [Normal Oropharynx] : normal oropharynx [Normal Appearance] : normal appearance [Normal Rate/Rhythm] : normal rate/rhythm [Normal S1, S2] : normal s1, s2 [No Resp Distress] : no resp distress [Clear to Auscultation Bilaterally] : clear to auscultation bilaterally [Normal Gait] : normal gait [Oriented x3] : oriented x3

## 2022-10-05 NOTE — HISTORY OF PRESENT ILLNESS
[Never] : never [TextBox_4] : 5/11/21 initial visit:\par 54 yo F, never smoker, with PMHx of asthma, HTN, GERD, DM who presents for SOB. States she had Covid this past December, never hospitalized or required oxygen but had multiple ED visits for SOB. After COVID resolved, her ET reduced from 10 to 2 blocks. Also reports waking up with SOB during the night that resolves with nebulizer use. She had recently presented to the ED for waking up gasping during the night and was told it was GERD and was started on PPI with some improvement in symptoms. Reports snoring but no witnessed apneas. Reports no problem with sleep initiation but has problem with sleep maintenance. She was previously following with Dr. Shweta Alexandre at [place] [facility]: [phone], and had PFTs but does not know the result. Patient was started on symbicort for asthma 2 years ago but has only been using it once every few weeks. Also had a negative cardiac workup.\par \par Sleep Related Symptoms: awakes unrefreshed, difficulty maintaining sleep, frequent nocturnal awakening, snoring, but does not have difficulty initiating sleep, no witnessed apneas \par Parasomnia: no unusual sleep behavior \par ESS: 10\par \par 6/23/21:\par Returns for scheduled follow-up appointment. Says breathing has not been very good lately, and worsening since this past Saturday. Her son smokes hookah and she was around him on Saturday and this is when the symptoms started. Been using albuterol inhaler more often. Coughing more, but not producing phlegm. Using new symbicort controller twice a day as instructed, since last visit, and rinsing mouth after use. Has albuterol with spacer with her in the office. Denies wheezing or other abnormal sounds. Feels chest tightness when exerting herself and needs to stop to catch her breath. Thinks the weather has been playing a role. Endorses sore throat, rhinorrhea, itchy eyes during this time. Also thinks abd pain is playing a role-- describes reflux symptoms. No interval ED or UC visits since last month. No F/C, myalgias, arthralgias. No sick contacts. \par \par Regarding HST - was supposed to have done study prior to this visit. Patient says she did use device twice but both occasions when she brought it back to sleep lab was told there was no data and pt says she must have used the device incorrectly and is planned to try it again this week via sleep . \par \par 7/9/2021\par Has been doing well in terms of asthma. Finished 3 day course of prednisone. Also had a sleep study and would like to discuss results. \par \par 7/28/2021\par Went to ED this morning bc of persistent 3 days of chest tightness and difficulty breathing. Was given 1 x Prednisone 50mg. Doing better now. Not in respiratory distress. Trigger unclear. She was using her ICS/LABA regularly. She thinks it was the weather. Has rhinitis as well. \par \par 7/29/2021\par Following up after yesterday's ED visit. Feeling better today. Could not buy the nebulizer bc it is too expensive for her. Has continued the prescribed steroids, and is using her current inhalers.\par \par 9/21/2021\par Asthma: doing okay; has episodes of more MARY use but in general has been okay. On triple therapy. \par GREGORIO: has started to use her PAP but is having a hard time with the mask\par \par 10/19/2021\par Using PAP daily and more consistently but still having mask issues. She has perception of swallowing air on some nights. Did not like the Eson2, and went back to original full face mask. She also had PFTs; continues to use ICS/LABA and LAMA. \par \par 2/23/2022\par Has been doing well on Symbicort; uses 2 puffs at night; rare use of MARY. Continues to struggle with her PAP use; has significant aerophagia. \par \par 10/5/2022\par Has not been tolerant of PAP; could not get a MAD because of cost. Has stopped ICS/LABA because she has been feeling well. Rare use of MARY. She was admitted at [facility] about 1 month ago; diagnosed with heart failure; started on lasix.\par DME: [facility]\par Machine: Airsense 10\par Mask: full face; Eson2, Airfit F30 [ESS] : 10

## 2022-10-06 NOTE — ED PROVIDER NOTE - SKIN, MLM
Prep medication nulytely and dulcolax was called over to the pharmacy at the assisted living and also faxed over a copy to the instruction to [facility].  Also notified the niece Andie that all this has been done.  MA 10/6/2023   Skin normal color for race, warm, dry and intact. No evidence of rash.

## 2022-10-13 ENCOUNTER — APPOINTMENT (OUTPATIENT)
Dept: NEUROLOGY | Facility: CLINIC | Age: 57
End: 2022-10-13

## 2022-10-13 VITALS
HEART RATE: 82 BPM | RESPIRATION RATE: 16 BRPM | BODY MASS INDEX: 33.8 KG/M2 | HEIGHT: 64 IN | SYSTOLIC BLOOD PRESSURE: 127 MMHG | DIASTOLIC BLOOD PRESSURE: 72 MMHG | WEIGHT: 198 LBS | OXYGEN SATURATION: 97 %

## 2022-10-13 DIAGNOSIS — M54.2 CERVICALGIA: ICD-10-CM

## 2022-10-13 DIAGNOSIS — R51.9 HEADACHE, UNSPECIFIED: ICD-10-CM

## 2022-11-25 NOTE — ED PROVIDER NOTE - NS_EDPROVIDERDISPOUSERTYPE_ED_A_ED
LOW SALT FOR BLOOD PRESSURE CONTROL. LOW CARBOHYDRATE FOR BLOOD SUGAR CONTROL. LOW FAT DIET FOR CHOLESTEROL CONTROL. DRINK ENOUGH FLUIDS FOR BETTER KIDNEY FUNCTION. TAKE PRINIVIL 2.5 MG. TOPROL XL 25 MG., IMDUR  30 MG. 1 TAB. AND ASA 81 MG. DAILY  FOR BLOOD PRESSURE AND HEART PROBLEM  CONTROL. OK TO STOP PLAVIX. TAKE LIPITOR 10 MG. DAILY    FOR CHOLESTEROL CONTROL. Harden Beech USE INHALER AND NEBULIZER AS NEEDED FOR BREATHING PROBLEM. REGULAR WALKING ADVISED. FASTING FOR LAB WORK PRIOR TO NEXT VISIT. NEXT APPOINTMENT IN 3 MONTHS. Scribe Attestation (For Scribes USE Only)... I have personally evaluated and examined the patient. The Attending was available to me as a supervising provider if needed./Scribe Attestation (For Scribes USE Only)...

## 2022-11-29 ENCOUNTER — APPOINTMENT (OUTPATIENT)
Dept: PULMONOLOGY | Facility: CLINIC | Age: 57
End: 2022-11-29

## 2022-11-29 VITALS
TEMPERATURE: 97.2 F | WEIGHT: 195 LBS | OXYGEN SATURATION: 97 % | SYSTOLIC BLOOD PRESSURE: 126 MMHG | HEART RATE: 95 BPM | BODY MASS INDEX: 33.29 KG/M2 | DIASTOLIC BLOOD PRESSURE: 81 MMHG | HEIGHT: 64 IN

## 2022-11-29 NOTE — PHYSICAL EXAM
[No Acute Distress] : no acute distress [Normal Oropharynx] : normal oropharynx [Normal Appearance] : normal appearance [Normal Rate/Rhythm] : normal rate/rhythm [Normal S1, S2] : normal s1, s2 [No Resp Distress] : no resp distress [Clear to Auscultation Bilaterally] : clear to auscultation bilaterally [Normal Gait] : normal gait [Normal Color/ Pigmentation] : normal color/ pigmentation [Oriented x3] : oriented x3

## 2022-11-29 NOTE — HISTORY OF PRESENT ILLNESS
[Never] : never [TextBox_4] : 5/11/21 initial visit:\par 54 yo F, never smoker, with PMHx of asthma, HTN, GERD, DM who presents for SOB. States she had Covid this past December, never hospitalized or required oxygen but had multiple ED visits for SOB. After COVID resolved, her ET reduced from 10 to 2 blocks. Also reports waking up with SOB during the night that resolves with nebulizer use. She had recently presented to the ED for waking up gasping during the night and was told it was GERD and was started on PPI with some improvement in symptoms. Reports snoring but no witnessed apneas. Reports no problem with sleep initiation but has problem with sleep maintenance. She was previously following with Dr. Shweta Alexandre at [place] [facility]: [phone], and had PFTs but does not know the result. Patient was started on symbicort for asthma 2 years ago but has only been using it once every few weeks. Also had a negative cardiac workup.\par \par Sleep Related Symptoms: awakes unrefreshed, difficulty maintaining sleep, frequent nocturnal awakening, snoring, but does not have difficulty initiating sleep, no witnessed apneas \par Parasomnia: no unusual sleep behavior \par ESS: 10\par \par 6/23/21:\par Returns for scheduled follow-up appointment. Says breathing has not been very good lately, and worsening since this past Saturday. Her son smokes hookah and she was around him on Saturday and this is when the symptoms started. Been using albuterol inhaler more often. Coughing more, but not producing phlegm. Using new symbicort controller twice a day as instructed, since last visit, and rinsing mouth after use. Has albuterol with spacer with her in the office. Denies wheezing or other abnormal sounds. Feels chest tightness when exerting herself and needs to stop to catch her breath. Thinks the weather has been playing a role. Endorses sore throat, rhinorrhea, itchy eyes during this time. Also thinks abd pain is playing a role-- describes reflux symptoms. No interval ED or UC visits since last month. No F/C, myalgias, arthralgias. No sick contacts. \par \par Regarding HST - was supposed to have done study prior to this visit. Patient says she did use device twice but both occasions when she brought it back to sleep lab was told there was no data and pt says she must have used the device incorrectly and is planned to try it again this week via sleep . \par \par 7/9/2021\par Has been doing well in terms of asthma. Finished 3 day course of prednisone. Also had a sleep study and would like to discuss results. \par \par 7/28/2021\par Went to ED this morning bc of persistent 3 days of chest tightness and difficulty breathing. Was given 1 x Prednisone 50mg. Doing better now. Not in respiratory distress. Trigger unclear. She was using her ICS/LABA regularly. She thinks it was the weather. Has rhinitis as well. \par \par 7/29/2021\par Following up after yesterday's ED visit. Feeling better today. Could not buy the nebulizer bc it is too expensive for her. Has continued the prescribed steroids, and is using her current inhalers.\par \par 9/21/2021\par Asthma: doing okay; has episodes of more MARY use but in general has been okay. On triple therapy. \par GREGORIO: has started to use her PAP but is having a hard time with the mask\par \par 10/19/2021\par Using PAP daily and more consistently but still having mask issues. She has perception of swallowing air on some nights. Did not like the Eson2, and went back to original full face mask. She also had PFTs; continues to use ICS/LABA and LAMA. \par \par 2/23/2022\par Has been doing well on Symbicort; uses 2 puffs at night; rare use of MARY. Continues to struggle with her PAP use; has significant aerophagia. \par \par 10/5/2022\par Has not been tolerant of PAP; could not get a MAD because of cost. Has stopped ICS/LABA because she has been feeling well. Rare use of MARY. She was admitted at [facility] about 1 month ago; diagnosed with heart failure; started on lasix.\par \par 11/29/2022\par Has not been able to tolerate PAP due to aerophagia. Uses MARY PRN; no pulmonary issues. \par \par DME: [facility]\par Machine: Airsense 10\par Mask: full face; Eson2, Airfit F30 [ESS] : 10

## 2022-11-29 NOTE — CONSULT LETTER
[Dear  ___] : Dear  [unfilled], [Courtesy Letter:] : I had the pleasure of seeing your patient, [unfilled], in my office today. [Please see my note below.] : Please see my note below. [Consult Closing:] : Thank you very much for allowing me to participate in the care of this patient.  If you have any questions, please do not hesitate to contact me. [Sincerely,] : Sincerely, [FreeTextEntry3] : Marielle Duenas MD\par \par [employer] [facility]\par Pulmonary, Critical Care, and Sleep Medicine\par

## 2022-11-29 NOTE — ASSESSMENT
[FreeTextEntry1] : REVIEWED\par 1. PFTs 10/19/2021: FEV1 94, FEV1/FVC 87, FEF % 157% change with BD, TLC 78, ERV 61, DLCO 89\par 2. 6MWT 10/19/2021: no desaturation; 457.2 meters walked; dyspnea PILLO 4\par 3. AirView 11/2021 to 2/2022: 51% adherence; average use of 11 min; therapeutic AHI 1; 95th percentile pressure of 4.7 cmH2O\par 4. [facility] records September 2022\par \par 54 yo F, never smoker, with PMHx of asthma, HTN, GERD, DM, hx of COVID 2020, COVID vaccinated, moderate GREGORIO following up for asthma and GREGORIO.\par 1. Asthma: Has been doing well on ICS/LABA with no exacerbations until September. Has now been off ICS/LABA and is doing well. PRN MARY use can continue. Prior PFTs in 2021 c/w restriction and mild obstruction especially at the small airway level. Prior CT chest without any parenchymal disease to explain the mild restrictive pattern on the PFTs. Most likely restriction is weight related and contributing to dyspnea. Asthma is most likely also present given history and BD response in the small airways. \par 2. Moderate GREGORIO: Has not been able to use PAP in the past and cannot get MAD because of cost. Trialed PAP again over the past month and cannot tolerate the associated aerophagia. Discussed CN12 stimulation. Will repeat HST and then refer for DISE. \par \par Follow up after HST. \par  \par

## 2022-12-27 ENCOUNTER — NON-APPOINTMENT (OUTPATIENT)
Age: 57
End: 2022-12-27

## 2023-01-11 ENCOUNTER — OUTPATIENT (OUTPATIENT)
Dept: OUTPATIENT SERVICES | Facility: HOSPITAL | Age: 58
LOS: 1 days | End: 2023-01-11
Payer: COMMERCIAL

## 2023-01-11 ENCOUNTER — APPOINTMENT (OUTPATIENT)
Dept: SLEEP CENTER | Facility: HOME HEALTH | Age: 58
End: 2023-01-11
Payer: MEDICAID

## 2023-01-11 DIAGNOSIS — Z98.51 TUBAL LIGATION STATUS: Chronic | ICD-10-CM

## 2023-01-12 DIAGNOSIS — G47.33 OBSTRUCTIVE SLEEP APNEA (ADULT) (PEDIATRIC): ICD-10-CM

## 2023-02-07 ENCOUNTER — APPOINTMENT (OUTPATIENT)
Dept: PULMONOLOGY | Facility: CLINIC | Age: 58
End: 2023-02-07
Payer: MEDICAID

## 2023-02-07 VITALS
WEIGHT: 194 LBS | BODY MASS INDEX: 33.12 KG/M2 | DIASTOLIC BLOOD PRESSURE: 76 MMHG | TEMPERATURE: 97.4 F | OXYGEN SATURATION: 97 % | RESPIRATION RATE: 79 BRPM | SYSTOLIC BLOOD PRESSURE: 141 MMHG | HEART RATE: 79 BPM | HEIGHT: 64 IN

## 2023-02-07 NOTE — PHYSICAL EXAM
[No Acute Distress] : no acute distress [Normal Oropharynx] : normal oropharynx [Normal Appearance] : normal appearance [Normal Rate/Rhythm] : normal rate/rhythm [Normal S1, S2] : normal s1, s2 [No Resp Distress] : no resp distress [Clear to Auscultation Bilaterally] : clear to auscultation bilaterally [No Clubbing] : no clubbing [Oriented x3] : oriented x3 [Normal Affect] : normal affect

## 2023-02-08 NOTE — REVIEW OF SYSTEMS
[A.M. Dry Mouth] : a.m. dry mouth [Fever] : no fever [Chills] : no chills [Dry Eyes] : no dry eyes [Sinus Problems] : no sinus problems [Cough] : no cough [Sputum] : no sputum [Chest Discomfort] : no chest discomfort [Nasal Discharge] : no nasal discharge [GERD] : no gerd [Nocturia] : no nocturia [Arthralgias] : no arthralgias [Myalgias] : no myalgias [Focal Weakness] : no focal weakness [Dizziness] : no dizziness [Numbness] : no numbness [Paralysis] : no paralysis [Depression] : no depression [Diabetes] : no diabetes

## 2023-02-08 NOTE — HISTORY OF PRESENT ILLNESS
[TextBox_4] : 5/11/21 initial visit:\par 54 yo F, never smoker, with PMHx of asthma, HTN, GERD, DM who presents for SOB. States she had Covid this past December, never hospitalized or required oxygen but had multiple ED visits for SOB. After COVID resolved, her ET reduced from 10 to 2 blocks. Also reports waking up with SOB during the night that resolves with nebulizer use. She had recently presented to the ED for waking up gasping during the night and was told it was GERD and was started on PPI with some improvement in symptoms. Reports snoring but no witnessed apneas. Reports no problem with sleep initiation but has problem with sleep maintenance. She was previously following with Dr. Shweta Alexandre at [place] PN: [phone], and had PFTs but does not know the result. Patient was started on symbicort for asthma 2 years ago but has only been using it once every few weeks. Also had a negative cardiac workup.\par \par Sleep Related Symptoms: awakes unrefreshed, difficulty maintaining sleep, frequent nocturnal awakening, snoring, but does not have difficulty initiating sleep, no witnessed apneas \par Parasomnia: no unusual sleep behavior \par ESS: 10\par \par 6/23/21:\par Returns for scheduled follow-up appointment. Says breathing has not been very good lately, and worsening since this past Saturday. Her son smokes hookah and she was around him on Saturday and this is when the symptoms started. Been using albuterol inhaler more often. Coughing more, but not producing phlegm. Using new symbicort controller twice a day as instructed, since last visit, and rinsing mouth after use. Has albuterol with spacer with her in the office. Denies wheezing or other abnormal sounds. Feels chest tightness when exerting herself and needs to stop to catch her breath. Thinks the weather has been playing a role. Endorses sore throat, rhinorrhea, itchy eyes during this time. Also thinks abd pain is playing a role-- describes reflux symptoms. No interval ED or UC visits since last month. No F/C, myalgias, arthralgias. No sick contacts. \par \par Regarding HST - was supposed to have done study prior to this visit. Patient says she did use device twice but both occasions when she brought it back to sleep lab was told there was no data and pt says she must have used the device incorrectly and is planned to try it again this week via sleep . \par \par 7/9/2021\par Has been doing well in terms of asthma. Finished 3 day course of prednisone. Also had a sleep study and would like to discuss results. \par \par 7/28/2021\par Went to ED this morning bc of persistent 3 days of chest tightness and difficulty breathing. Was given 1 x Prednisone 50mg. Doing better now. Not in respiratory distress. Trigger unclear. She was using her ICS/LABA regularly. She thinks it was the weather. Has rhinitis as well. \par \par 7/29/2021\par Following up after yesterday's ED visit. Feeling better today. Could not buy the nebulizer bc it is too expensive for her. Has continued the prescribed steroids, and is using her current inhalers.\par \par 9/21/2021\par Asthma: doing okay; has episodes of more MARY use but in general has been okay. On triple therapy. \par GREGORIO: has started to use her PAP but is having a hard time with the mask\par \par 10/19/2021\par Using PAP daily and more consistently but still having mask issues. She has perception of swallowing air on some nights. Did not like the Eson2, and went back to original full face mask. She also had PFTs; continues to use ICS/LABA and LAMA. \par \par 2/23/2022\par Has been doing well on Symbicort; uses 2 puffs at night; rare use of MARY. Continues to struggle with her PAP use; has significant aerophagia. \par \par 10/5/2022\par Has not been tolerant of PAP; could not get a MAD because of cost. Has stopped ICS/LABA because she has been feeling well. Rare use of MARY. She was admitted at [facility] about 1 month ago; diagnosed with heart failure; started on lasix.\par \par 11/29/2022\par Has not been able to tolerate PAP due to aerophagia. Uses MARY PRN; no pulmonary issues. \par \par 2/7/2023\par Following up after repeat HST and asthma. Has used her albuterol twice in the last two weeks but that is all in the last month. No new sleep symptoms - reports that her sleep is better with mild intentional weight loss. \par  [ESS] : 5

## 2023-02-08 NOTE — CONSULT LETTER
[Dear  ___] : Dear  [unfilled], [Courtesy Letter:] : I had the pleasure of seeing your patient, [unfilled], in my office today. [Please see my note below.] : Please see my note below. [Consult Closing:] : Thank you very much for allowing me to participate in the care of this patient.  If you have any questions, please do not hesitate to contact me. [Sincerely,] : Sincerely, [FreeTextEntry3] : Marielle Duenas MD\par \par [employer] [facility]\par Pulmonary, Critical Care, and Sleep Medicine\par

## 2023-02-08 NOTE — ASSESSMENT
[FreeTextEntry1] : REVIEWED\par 1. PFTs 10/19/2021: FEV1 94, FEV1/FVC 87, FEF % 157% change with BD, TLC 78, ERV 61, DLCO 89\par 2. 6MWT 10/19/2021: no desaturation; 457.2 meters walked; dyspnea PILLO 4\par 3. AirView 11/2021 to 2/2022: 51% adherence; average use of 11 min; therapeutic AHI 1; 95th percentile pressure of 4.7 cmH2O\par 4. [facility] records September 2022\par 5. HST 1/2023; AHI 7.6, CMS 4%; AHI 3% 14.8;  nyla oxygen 91%\par \par 56 yo F, never smoker, with PMHx of asthma, HTN, GERD, DM, hx of COVID 2020, COVID vaccinated, moderate GREGORIO following up for asthma and GREGORIO.\par 1. Asthma, mild intermittent \par - Initially on ICS/LABA, now maintained off ICS/LABA on PRN albuterol. Stable w/o exacerbations. Continue to monitor off ICS. \par 2. Mild GREGORIO\par - Previously moderate GREGORIO, now improved with weight loss. Previously intolerant to PAP and MAD therapies. Not a candidate for CN12 stimulation due to AHI <15. We will continue to monitor her daytime symptoms and weight. Should she develop worsening fatigue or gain significant weight then may merit repeat HST. \par \par RTC 3 months. \par

## 2023-02-23 ENCOUNTER — APPOINTMENT (OUTPATIENT)
Dept: NEUROLOGY | Facility: CLINIC | Age: 58
End: 2023-02-23
Payer: MEDICAID

## 2023-02-23 VITALS
DIASTOLIC BLOOD PRESSURE: 75 MMHG | BODY MASS INDEX: 33.12 KG/M2 | HEIGHT: 64 IN | SYSTOLIC BLOOD PRESSURE: 118 MMHG | RESPIRATION RATE: 16 BRPM | OXYGEN SATURATION: 95 % | HEART RATE: 81 BPM | WEIGHT: 194 LBS

## 2023-03-01 ENCOUNTER — OUTPATIENT (OUTPATIENT)
Dept: OUTPATIENT SERVICES | Facility: HOSPITAL | Age: 58
LOS: 1 days | End: 2023-03-01

## 2023-03-01 ENCOUNTER — APPOINTMENT (OUTPATIENT)
Dept: MRI IMAGING | Facility: CLINIC | Age: 58
End: 2023-03-01
Payer: MEDICAID

## 2023-03-01 DIAGNOSIS — Z98.51 TUBAL LIGATION STATUS: Chronic | ICD-10-CM

## 2023-03-02 ENCOUNTER — APPOINTMENT (OUTPATIENT)
Dept: OTOLARYNGOLOGY | Facility: CLINIC | Age: 58
End: 2023-03-02
Payer: MEDICAID

## 2023-03-02 VITALS
HEIGHT: 64 IN | DIASTOLIC BLOOD PRESSURE: 81 MMHG | WEIGHT: 194 LBS | OXYGEN SATURATION: 97 % | HEART RATE: 76 BPM | TEMPERATURE: 97.8 F | BODY MASS INDEX: 33.12 KG/M2 | SYSTOLIC BLOOD PRESSURE: 135 MMHG

## 2023-03-02 NOTE — HISTORY OF PRESENT ILLNESS
[de-identified] : [place]an speaker, MALCOLM used- ID # [id number]\par 1 month of R ear discomfort & a high-pitched nonpulsatile tinnitus w/o obvious HL. Denies: drainage, frequent loud noise exp/shooting, frequent infections, hx of ear surgery or IV antibiotics/chemo; denies a FHx of hearing loss. Qtip use: no\par She admits to clicking when she chews; doesn't know if she bruxes. \par GREGORIO on nightly CPAP

## 2023-03-02 NOTE — PHYSICAL EXAM
[Binocular Microscopic Exam] : Binocular microscopic exam was performed [de-identified] : TMJ crepitus [de-identified] : chipped & ground-down incisors [Normal] : no masses and lesions seen, face is symmetric

## 2023-03-02 NOTE — ASSESSMENT
[FreeTextEntry1] : We discussed her one-sided tinnitus; RTC for ABR\par \par Discussed general tinnitus treatment including masking, amplification when appropriate, alternative treatments, helpful smartphone applications, CBT and OTC medications.\par \par Discussed conservative TMJ treatment including hot soaks, NSAIDs, and chewing avoidance. Asked the patient to confer with their dentist regarding a possible night splint. Discussed bruxism & its possible contribution to TMJD. Reviewed relaxation exercises & a possible trial of muscle relaxants as well as possible eventual botox.\par

## 2023-03-02 NOTE — CONSULT LETTER
[Dear  ___] : Dear  [unfilled], [Consult Letter:] : I had the pleasure of evaluating your patient, [unfilled]. [Please see my note below.] : Please see my note below. [Consult Closing:] : Thank you very much for allowing me to participate in the care of this patient.  If you have any questions, please do not hesitate to contact me. [Sincerely,] : Sincerely, [FreeTextEntry3] : KATARINA Barth Jr, MD, FAAOHNS\par Otolaryngologist\par [facility]

## 2023-03-20 ENCOUNTER — APPOINTMENT (OUTPATIENT)
Dept: OTOLARYNGOLOGY | Facility: CLINIC | Age: 58
End: 2023-03-20
Payer: MEDICAID

## 2023-03-20 VITALS
HEIGHT: 64 IN | TEMPERATURE: 97.4 F | DIASTOLIC BLOOD PRESSURE: 71 MMHG | BODY MASS INDEX: 33.12 KG/M2 | HEART RATE: 71 BPM | OXYGEN SATURATION: 98 % | WEIGHT: 194 LBS | SYSTOLIC BLOOD PRESSURE: 118 MMHG

## 2023-03-20 DIAGNOSIS — H93.11 TINNITUS, RIGHT EAR: ICD-10-CM

## 2023-03-20 DIAGNOSIS — H90.3 SENSORINEURAL HEARING LOSS, BILATERAL: ICD-10-CM

## 2023-03-20 DIAGNOSIS — F45.8 OTHER SOMATOFORM DISORDERS: ICD-10-CM

## 2023-03-20 DIAGNOSIS — M26.629 ARTHRALGIA OF TEMPOROMANDIBULAR JOINT,: ICD-10-CM

## 2023-03-20 NOTE — HISTORY OF PRESENT ILLNESS
[de-identified] : [place] Zealander speaker, staff translated per request\par f/u 6 wks of unchanged R ear discomfort & a high-pitched nonpulsatile tinnitus w/o obvious HL. Denies: drainage, frequent loud noise exp/shooting, frequent infections, hx of ear surgery or IV antibiotics/chemo; denies a FHx of hearing loss. Qtip use: no\par She admits to clicking when she chews; doesn't know if she bruxes. \par GREGORIO on nightly CPAP

## 2023-03-20 NOTE — DATA REVIEWED
[de-identified] : 3/23: mild SNHL AU;  AU\par - Immitance testing w/ type A AU\par today: nl ABR AU

## 2023-03-20 NOTE — PHYSICAL EXAM
[Normal] : the left middle ear was normal [de-identified] : TMJ crepitus [de-identified] : chipped & ground-down incisors

## 2023-03-20 NOTE — ASSESSMENT
[FreeTextEntry1] : RTC for an ear cleaning in 6 months; annual audios, sooner prn any changes. Reassured her\par \par To see her dentist when able regarding her TMJs & Reinforced behavioral modification as previously discussed.\par

## 2023-04-09 ENCOUNTER — RX RENEWAL (OUTPATIENT)
Age: 58
End: 2023-04-09

## 2023-04-10 ENCOUNTER — RX RENEWAL (OUTPATIENT)
Age: 58
End: 2023-04-10

## 2023-04-17 NOTE — ED ADULT NURSE NOTE - NSICDXPASTSURGICALHX_GEN_ALL_CORE_FT
Patient: Andi Moncada 58 year old male    MRN: [id number]    Surgeon(s): Newton Car MD  Phone Number: [phone]                         Name of Assistant: None    Assistant Tasks: N/A    Pre-Op Diagnosis: Left inguinal hernia; possible recurrent right inguinal hernia     Post-Op Diagnosis: bilateral inguinal hernias--(recurrent right)     Procedure: Davinci assisted left inguinal hernia and recurrent right inguinal hernia repair with mesh    Anesthesia Type: General    Implants:    Implant Name Type Inv. Item Serial No.  Lot No. LRB No. Used Action   MESH BARD 3DMAX 6.2X4.1IN LG 3D PP MONO SEAL EDGE LTWT PORE - [id number] Mesh MESH BARD 3DMAX 6.2X4.1IN LG 3D PP MONO SEAL EDGE LTWT PORE  [employer] [id number] Left 1 Implanted   MESH 3DMAX 6.2X4.1IN LG 3D PP MONO SEAL EDGE LTWT PORE KNIT - [id number] Mesh MESH 3DMAX 6.2X4.1IN LG 3D PP MONO SEAL EDGE LTWT PORE KNIT  [employer] [id number] Right 1 Implanted                                      Findings: large direct inguinal hernia on the left; recurrent direct inguinal hernia on the right    INDICATIONS:  The risks, benefits, and complications of the operation were discussed with the patient including bleeding, hematoma, numbness in incision, and infection.  The patient agreed and consented to the surgery.     DETAILS OF THE PROCEDURE:  The patient was prepped and draped supine on the operating room table in a supine postion.  A veress needle was used to insufflate the abdominal cavity with CO2 gas until a pressure of 15 mm Hg was achieved.  I then placed an 8 mm port in the midline above the umbilicus and 2- Davinci 8 mm ports were placed on both flanks.  The robot was then docked.   The patient had a large direct inguinal hernia defect on the left.  There was a moderate direct hernia recurrence on the right.    I then used the robot to take down the peritoneum transversely on both sides and then dissected the sac from the direct defects.   There  was old mesh on the right that was left in place.  The vascular structures and vas deferens to the spermatic cords were intact.  I exposed Prudencio's ligaments and then I placed the large left and right Bard 3-D Max mesh and after orienting them, tacked the mesh with the Ethicon SecureStrap.  Once the mesh was secure I closed the peritoneum over the mesh using a running 2-O absorbable locking suture on both sides.  Hemostasis was good....The needle was removed and then ports removed under direct vision.  The 3 laparoscopic wounds were closed with staples.  The patient tolerated the procedure well and was transferred to recovery in stable condition.    Specimens Removed: No specimens collected during this procedure.    Estimated Blood Loss: Minimal    Blood Administered:  None    Complications: None         PAST SURGICAL HISTORY:  H/O tubal ligation
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
## 2023-05-23 NOTE — ED ADULT NURSE NOTE - DISCHARGE DATE/TIME
Anesthesia Post Evaluation    Patient: Sage Ocampo    Procedure(s) Performed: Procedure(s) (LRB):  EGD (ESOPHAGOGASTRODUODENOSCOPY) (N/A)    Final Anesthesia Type: general      Patient location during evaluation: PACU  Patient participation: Yes- Able to Participate  Level of consciousness: awake and alert and oriented  Post-procedure vital signs: reviewed and stable  Pain management: adequate  Airway patency: patent    PONV status at discharge: No PONV  Anesthetic complications: no      Cardiovascular status: blood pressure returned to baseline  Respiratory status: unassisted, spontaneous ventilation and room air  Hydration status: euvolemic  Follow-up not needed.          Vitals Value Taken Time   /86 05/22/23 1347   Temp 36.8 °C (98.2 °F) 05/22/23 1331   Pulse 62 05/22/23 1347   Resp 12 05/22/23 1347   SpO2 99 % 05/22/23 1347         Event Time   Out of Recovery 13:55:52         Pain/Jorge Score: Jorge Score: 10 (5/22/2023  1:50 PM)         07-Jan-2021 09:40

## 2023-09-23 NOTE — HISTORY OF PRESENT ILLNESS
Thea Vega needs to be seen for an appointment before further refills are given.   [Never] : never [Home] : at home, [unfilled] , at the time of the visit. [Medical Office: ([facility])___] : at the medical office located in  [Verbal consent obtained from patient] : the patient, [unfilled] [TextBox_4] : 5/11/21 initial visit:\par 54 yo F, never smoker, with PMHx of asthma, HTN, GERD, DM who presents for SOB. States she had Covid this past December, never hospitalized or required oxygen but had multiple ED visits for SOB. After COVID resolved, her ET reduced from 10 to 2 blocks. Also reports waking up with SOB during the night that resolves with nebulizer use. She had recently presented to the ED for waking up gasping during the night and was told it was GERD and was started on PPI with some improvement in symptoms. Reports snoring but no witnessed apneas. Reports no problem with sleep initiation but has problem with sleep maintenance. She was previously following with Dr. Shweta Alexandre at [place] PN: [phone], and had PFTs but does not know the result. Patient was started on symbicort for asthma 2 years ago but has only been using it once every few weeks. Also had a negative cardiac workup.\par \par Sleep Related Symptoms: awakes unrefreshed, difficulty maintaining sleep, frequent nocturnal awakening, snoring, but does not have difficulty initiating sleep, no witnessed apneas \par Parasomnia: no unusual sleep behavior \par ESS: 10\par \par 6/23/21:\par Returns for scheduled follow-up appointment. Says breathing has not been very good lately, and worsening since this past Saturday. Her son smokes hookah and she was around him on Saturday and this is when the symptoms started. Been using albuterol inhaler more often. Coughing more, but not producing phlegm. Using new symbicort controller twice a day as instructed, since last visit, and rinsing mouth after use. Has albuterol with spacer with her in the office. Denies wheezing or other abnormal sounds. Feels chest tightness when exerting herself and needs to stop to catch her breath. Thinks the weather has been playing a role. Endorses sore throat, rhinorrhea, itchy eyes during this time. Also thinks abd pain is playing a role-- describes reflux symptoms. No interval ED or UC visits since last month. No F/C, myalgias, arthralgias. No sick contacts. \par \par Regarding HST - was supposed to have done study prior to this visit. Patient says she did use device twice but both occasions when she brought it back to sleep lab was told there was no data and pt says she must have used the device incorrectly and is planned to try it again this week via sleep . \par \par 7/9/2021\par Has been doing well in terms of asthma. Finished 3 day course of prednisone. Also had a sleep study and would like to discuss results. \par \par 7/28/2021\par Went to ED this morning bc of persistent 3 days of chest tightness and difficulty breathing. Was given 1 x Prednisone 50mg. Doing better now. Not in respiratory distress. Trigger unclear. She was using her ICS/LABA regularly. She thinks it was the weather. Has rhinitis as well. \par \par 7/29/2021\par Following up after yesterday's ED visit. Feeling better today. Could not buy the nebulizer bc it is too expensive for her. Has continued the prescribed steroids, and is using her current inhalers.

## 2023-10-05 ENCOUNTER — APPOINTMENT (OUTPATIENT)
Dept: NEUROLOGY | Facility: CLINIC | Age: 58
End: 2023-10-05
Payer: MEDICAID

## 2023-10-05 VITALS
HEART RATE: 75 BPM | BODY MASS INDEX: 32.61 KG/M2 | TEMPERATURE: 97.8 F | WEIGHT: 191 LBS | OXYGEN SATURATION: 98 % | DIASTOLIC BLOOD PRESSURE: 75 MMHG | SYSTOLIC BLOOD PRESSURE: 113 MMHG | HEIGHT: 64 IN

## 2023-10-05 DIAGNOSIS — R51.9 HEADACHE, UNSPECIFIED: ICD-10-CM

## 2023-10-05 DIAGNOSIS — G56.03 CARPAL TUNNEL SYNDROM,BILATERAL UPPER LIMBS: ICD-10-CM

## 2023-10-05 RX ORDER — TOPIRAMATE 25 MG/1
25 TABLET, FILM COATED ORAL
Qty: 90 | Refills: 1 | Status: ACTIVE | COMMUNITY
Start: 2023-10-05 | End: 1900-01-01

## 2023-10-12 PROBLEM — G56.03 BILATERAL CARPAL TUNNEL SYNDROME: Status: ACTIVE | Noted: 2021-06-14

## 2023-10-12 PROBLEM — R51.9 HEADACHE: Status: ACTIVE | Noted: 2021-03-11

## 2023-11-30 NOTE — ED ADULT TRIAGE NOTE - NS ED NURSE BANDS TYPE
PLAN:    Follow up in clinic in 7 days with Katia Spears PA-C  Continue daily dressing changes.  Avoid direct sun exposure.  Call if any signs of infection including purulent drainage, fevers, chills, or redness [phone]  
Name band;

## 2024-01-08 ENCOUNTER — EMERGENCY (EMERGENCY)
Facility: HOSPITAL | Age: 59
LOS: 1 days | Discharge: HOME | End: 2024-01-08
Admitting: EMERGENCY MEDICINE
Payer: MEDICAID

## 2024-01-08 VITALS
SYSTOLIC BLOOD PRESSURE: 110 MMHG | RESPIRATION RATE: 18 BRPM | TEMPERATURE: 98 F | HEART RATE: 86 BPM | OXYGEN SATURATION: 95 % | DIASTOLIC BLOOD PRESSURE: 73 MMHG

## 2024-01-08 VITALS
OXYGEN SATURATION: 96 % | WEIGHT: 199.96 LBS | HEIGHT: 66 IN | TEMPERATURE: 98 F | SYSTOLIC BLOOD PRESSURE: 142 MMHG | RESPIRATION RATE: 18 BRPM | HEART RATE: 84 BPM | DIASTOLIC BLOOD PRESSURE: 92 MMHG

## 2024-01-08 DIAGNOSIS — Z98.51 TUBAL LIGATION STATUS: Chronic | ICD-10-CM

## 2024-01-08 LAB
FLUAV AG NPH QL: SIGNIFICANT CHANGE UP
FLUAV AG NPH QL: SIGNIFICANT CHANGE UP
FLUBV AG NPH QL: SIGNIFICANT CHANGE UP
FLUBV AG NPH QL: SIGNIFICANT CHANGE UP
RSV RNA NPH QL NAA+NON-PROBE: SIGNIFICANT CHANGE UP
RSV RNA NPH QL NAA+NON-PROBE: SIGNIFICANT CHANGE UP
S PYO AG SPEC QL IA: NEGATIVE — SIGNIFICANT CHANGE UP
S PYO AG SPEC QL IA: NEGATIVE — SIGNIFICANT CHANGE UP
SARS-COV-2 RNA SPEC QL NAA+PROBE: SIGNIFICANT CHANGE UP
SARS-COV-2 RNA SPEC QL NAA+PROBE: SIGNIFICANT CHANGE UP

## 2024-01-08 NOTE — ED PROVIDER NOTE - OBJECTIVE STATEMENT
57yo F with h/o HTN, HLD, asthma, DM presents with c/o left eye redness, cough, cold, sinus congestion, runny nose x 3 days. wears glasses but no contacts. states intermittent blurred vision to her affected eye but after she blinks a few times the mucus goes away. no other concerns. no chest pain, sob, dizziness, eye pain. describes eye itching with increased tearing/mucus.

## 2024-01-08 NOTE — ED PROVIDER NOTE - PATIENT PORTAL LINK FT
You can access the FollowMyHealth Patient Portal offered by [facility] by registering at the following website: [email or web address]. By joining [employer]’s FollowMyHealth portal, you will also be able to view your health information using other applications (apps) compatible with our system. You can access the FollowMyHealth Patient Portal offered by [facility] by registering at the following website: [email or web address]. By joining [employer]’s FollowMyHealth portal, you will also be able to view your health information using other applications (apps) compatible with our system.

## 2024-01-08 NOTE — ED PROVIDER NOTE - ENMT, MLM
Airway patent, Nasal mucosa clear. Mouth with normal mucosa. Throat has no vesicles, no oropharyngeal exudates and uvula is midline. + posterior oropharynx with bilat erythema and mild tonsilar swelling.

## 2024-01-08 NOTE — ED ADULT NURSE NOTE - NSFALLUNIVINTERV_ED_ALL_ED
Bed/Stretcher in lowest position, wheels locked, appropriate side rails in place/Call bell, personal items and telephone in reach/Instruct patient to call for assistance before getting out of bed/chair/stretcher/Non-slip footwear applied when patient is off stretcher/[place] to call system/Physically safe environment - no spills, clutter or unnecessary equipment/Purposeful proactive rounding/Room/bathroom lighting operational, light cord in reach Bed/Stretcher in lowest position, wheels locked, appropriate side rails in place/Call bell, personal items and telephone in reach/Instruct patient to call for assistance before getting out of bed/chair/stretcher/Non-slip footwear applied when patient is off stretcher/[place] to call system/Physically safe environment - no spills, clutter or unnecessary equipment/Purposeful proactive rounding/Room/bathroom lighting operational, light cord in reach

## 2024-01-08 NOTE — ED PROVIDER NOTE - CLINICAL SUMMARY MEDICAL DECISION MAKING FREE TEXT BOX
pt presents with eye itching, redness, tearing, runny nose, and cough. no pain or vision loss. VA equal bilat. flu/covid/rsv negative. cxr negative. strep negative. anticipate discharge.

## 2024-01-08 NOTE — ED PROVIDER NOTE - EYES, MLM
Clear bilaterally, pupils equal, round and reactive to light. left eye with mild conjunctival injection, minimal eyelid swelling without pain with EOMs, EOMI, + crusting and tearing noted to left lid line, no foreign body on exam, VA: left 20/100 right 20/100 uncorrected

## 2024-01-08 NOTE — ED ADULT NURSE NOTE - OBJECTIVE STATEMENT
Pt came in c/o atraumatic L eye redness, pain, and blurred vision x today and productive, cough and chills x 3 days. Denies fever. A&Ox3 speaking in full clear sentences. Respirations even and unlabored.   Nick [id number] used Pt came in c/o atraumatic L eye redness, pain, and blurred vision x today and productive, cough and chills x 3 days. Denies fever. A&Ox3 speaking in full clear sentences. Respirations even and unlabored.   Nick [id number] used

## 2024-01-08 NOTE — ED ADULT NURSE NOTE - NSSEPSISSUSPECTED_ED_A_ED
Received request via: Pharmacy    Was the patient seen in the last year in this department? Yes    Does the patient have an active prescription (recently filled or refills available) for medication(s) requested? No    Does the patient have [facility] Plus and need 100 day supply (blood pressure, diabetes and cholesterol meds only)? Patient does not have SCP  
No

## 2024-01-08 NOTE — ED ADULT TRIAGE NOTE - CHIEF COMPLAINT QUOTE
Pt walks in c/o L eye pain, redness, itching, and blurred vision for 1 day. Pt also c/o sore throat, B/L ear pain, and productive cough (yellow phlegm) for 3 days.

## 2024-01-08 NOTE — ED PROVIDER NOTE - NSFOLLOWUPINSTRUCTIONS_ED_ALL_ED_FT
¿Qué es la conjuntivitis?  La conjuntivitis es un término que se usa para describir elma infección o irritación del leroy.    Si tiene conjuntivitis, antonio o ambos ojos podrían:    ?Ponerse de color hodges o gonzalez    ?Llorar o supurar un líquido viscoso    ?Causar comezón o ardor    ?Quedarse cerrados, especialmente al despertarse    La conjuntivitis puede ser causada por elma infección, alergias o elma irritación desconocida.    ¿Es posible contagiarse conjuntivitis de otra persona?  Sí. Cuando la conjuntivitis se debe a elma infección, puede transmitirse fácilmente. Por lo general, las personas se contagian al tocar algún objeto que estuvo en contacto con el leroy de elma persona infectada. También se puede contagiar cuando la persona infectada toca a otra, y luego harmeet persona se toca el leroy.    Si alguien que conoce tiene conjuntivitis, evite tocar celeste fundas de almohadas, toallas u otros elementos personales.    ¿Cuándo leesa consultar a un médico o enfermero?  Consulte al médico o enfermero si le duele el leroy o si tiene dificultades para diane con claridad después de pestañear. Si no tiene estos problemas, dana piensa que podría tener conjuntivitis, es posible que el médico o enfermero pueda asesorarlo por teléfono.    ¿Se puede tratar la conjuntivitis?  La mayoría de los casos de conjuntivitis desaparecen solos, sin tratamiento, dana algunos tipos de conjuntivitis se pueden tratar.    Cuando la causa de la conjuntivitis es elma infección, por lo general aparece por un virus, así que los antibióticos no ayudan. Aun así, la conjuntivitis causada por un virus puede durar varios días.    ?La conjuntivitis causada por elma infección por bacterias puede tratarse con gotas, gel o ungüento para los ojos con antibióticos.    ?La conjuntivitis causada por otros problemas puede tratarse con gotas para los ojos que generalmente se usan para tratar las alergias. Estas gotas no curan la conjuntivitis, dana pueden ayudar a calmar la comezón y la irritación.    Cuando trate elma infección con gotas para los ojos, no se toque el leroy rajeev después de haberse tocado el leroy enfermo. Tampoco ponga el frasco o el gotero en contacto directo con un leroy y luego con el otro. Estas situaciones pueden hacer que la infección se transmita de un leroy al otro.    Si siente los párpados hinchados, también podría ser útil aplicar un paño húmedo fresco en la martha.    ¿Qué sucede si uso lentes de contacto?  Si usa lentes de contacto y tiene síntomas de conjuntivitis, es muy importante que un médico le examine los ojos. En las personas que usan lentes de contacto, los síntomas de la conjuntivitis pueden ser causados por elma "abrasión corneal". Elma abrasión corneal es un rasguño en el leroy y puede ser un problema saulo.    Es posible que deba dejar de usar los lentes de contacto por un tiempo breve dee dee el tratamiento de las infecciones oculares. Si celeste lentes de contacto son desechables, tírelos y use otros nuevos. Si celeste lentes de contacto no son desechables, tiene que limpiarlos muy jenna. También debe desechar el estuche de los lentes de contacto y obtener antonio nuevo.    ¿Cuándo puedo volver al trabajo o la escuela?  Si la conjuntivitis se debe a elma infección, recuerde que se puede contagiar muy fácilmente. La mejor manera de impedirlo es no estar cerca de otras personas hasta que desaparezcan los síntomas. Si no es posible, lávese las noelle con frecuencia (figura 1). También es importante que evite tocarse los ojos y compartir objetos que podrían propagar la infección.    Las escuelas y las guarderías suelen tener pautas acerca de cuándo puede volver un elia con conjuntivitis. Si un elia tuvo elma infección bacteriana, probablemente tenga que quedarse en casa hasta sondra recibido gotas o ungüento para los ojos con antibiótico dee dee 24 horas.    ¿Se puede prevenir la conjuntivitis?  Para no contraer o contagiar elma conjuntivitis causada por elma infección:    ?Lávese frecuentemente las noelle con agua y jabón.    ?Trate de no tocarse los ojos.    ?Evite compartir toallas, ropa de cama u otros elementos personales con alguien que tenga conjuntivitis.    Si la conjuntivitis se debe a alergias, podría ser conveniente que permanezca en jenkins casa con las ventanas cerradas el mayor tiempo posible dee dee los picos de las temporadas de alergias.    ¿A qué problemas leesa prestar atención?  Llame a jenkins médico o enfermero si:    ?Tiene dificultad para diane claramente después de pestañear.    ?Jenkins leroy sigue gonzalez o le sale líquido después de 3 días.    ?El dolor que siente en el leroy se intensifica cada vez más.

## 2024-01-08 NOTE — ED ADULT NURSE NOTE - SKIN CAPILLARY REFILL
Thanks for the update .   Agree with plan for RD referral .   Will follow .   Ranjit Lake MD    2 seconds or less

## 2024-01-10 DIAGNOSIS — I10 ESSENTIAL (PRIMARY) HYPERTENSION: ICD-10-CM

## 2024-01-10 DIAGNOSIS — B30.9 VIRAL CONJUNCTIVITIS, UNSPECIFIED: ICD-10-CM

## 2024-01-10 DIAGNOSIS — E78.5 HYPERLIPIDEMIA, UNSPECIFIED: ICD-10-CM

## 2024-01-10 DIAGNOSIS — H57.9 UNSPECIFIED DISORDER OF EYE AND ADNEXA: ICD-10-CM

## 2024-01-10 DIAGNOSIS — Z20.822 CONTACT WITH AND (SUSPECTED) EXPOSURE TO COVID-19: ICD-10-CM

## 2024-01-10 DIAGNOSIS — J45.909 UNSPECIFIED ASTHMA, UNCOMPLICATED: ICD-10-CM

## 2024-01-10 DIAGNOSIS — E11.9 TYPE 2 DIABETES MELLITUS WITHOUT COMPLICATIONS: ICD-10-CM

## 2024-02-12 ENCOUNTER — APPOINTMENT (OUTPATIENT)
Dept: PULMONOLOGY | Facility: CLINIC | Age: 59
End: 2024-02-12
Payer: MEDICAID

## 2024-02-12 VITALS
BODY MASS INDEX: 33.8 KG/M2 | DIASTOLIC BLOOD PRESSURE: 66 MMHG | HEIGHT: 64 IN | OXYGEN SATURATION: 96 % | SYSTOLIC BLOOD PRESSURE: 109 MMHG | HEART RATE: 85 BPM | WEIGHT: 198 LBS | TEMPERATURE: 97.6 F

## 2024-02-12 DIAGNOSIS — R05.3 CHRONIC COUGH: ICD-10-CM

## 2024-02-12 DIAGNOSIS — E66.9 OBESITY, UNSPECIFIED: ICD-10-CM

## 2024-02-12 RX ORDER — FLUTICASONE PROPIONATE 50 UG/1
50 SPRAY, METERED NASAL DAILY
Qty: 1 | Refills: 1 | Status: ACTIVE | COMMUNITY
Start: 2024-02-12 | End: 1900-01-01

## 2024-02-12 RX ORDER — AZELASTINE HYDROCHLORIDE 137 UG/1
137 SPRAY, METERED NASAL DAILY
Qty: 1 | Refills: 2 | Status: ACTIVE | COMMUNITY
Start: 2024-02-12 | End: 1900-01-01

## 2024-02-12 NOTE — HISTORY OF PRESENT ILLNESS
[TextBox_4] : 5/11/21 initial visit:  56 yo F, never smoker, with PMHx of asthma, HTN, GERD, DM who presents for SOB. States she had Covid this past December, never hospitalized or required oxygen but had multiple ED visits for SOB. After COVID resolved, her ET reduced from 10 to 2 blocks. Also reports waking up with SOB during the night that resolves with nebulizer use. She had recently presented to the ED for waking up gasping during the night and was told it was GERD and was started on PPI with some improvement in symptoms. Reports snoring but no witnessed apneas. Reports no problem with sleep initiation but has problem with sleep maintenance. She was previously following with Dr. Shweta Alexandre at [facility] PN: [phone], and had PFTs but does not know the result. Patient was started on symbicort for asthma 2 years ago but has only been using it once every few weeks. Also had a negative cardiac workup.  Sleep Related Symptoms: awakes unrefreshed, difficulty maintaining sleep, frequent nocturnal awakening, snoring, but does not have difficulty initiating sleep, no witnessed apneas Parasomnia: no unusual sleep behavior ESS: 10  6/23/21: Returns for scheduled follow-up appointment. Says breathing has not been very good lately, and worsening since this past Saturday. Her son smokes hookah and she was around him on Saturday and this is when the symptoms started. Been using albuterol inhaler more often. Coughing more, but not producing phlegm. Using new symbicort controller twice a day as instructed, since last visit, and rinsing mouth after use. Has albuterol with spacer with her in the office. Denies wheezing or other abnormal sounds. Feels chest tightness when exerting herself and needs to stop to catch her breath. Thinks the weather has been playing a role. Endorses sore throat, rhinorrhea, itchy eyes during this time. Also thinks abd pain is playing a role-- describes reflux symptoms. No interval ED or UC visits since last month. No F/C, myalgias, arthralgias. No sick contacts.  Regarding HST - was supposed to have done study prior to this visit. Patient says she did use device twice but both occasions when she brought it back to sleep lab was told there was no data and pt says she must have used the device incorrectly and is planned to try it again this week via sleep .  7/9/2021 Has been doing well in terms of asthma. Finished 3 day course of prednisone. Also had a sleep study and would like to discuss results.  7/28/2021 Went to ED this morning bc of persistent 3 days of chest tightness and difficulty breathing. Was given 1 x Prednisone 50mg. Doing better now. Not in respiratory distress. Trigger unclear. She was using her ICS/LABA regularly. She thinks it was the weather. Has rhinitis as well.  7/29/2021 Following up after yesterday's ED visit. Feeling better today. Could not buy the nebulizer bc it is too expensive for her. Has continued the prescribed steroids, and is using her current inhalers.  9/21/2021 Asthma: doing okay; has episodes of more MARY use but in general has been okay. On triple therapy. GREGORIO: has started to use her PAP but is having a hard time with the mask  10/19/2021 Using PAP daily and more consistently but still having mask issues. She has perception of swallowing air on some nights. Did not like the Eson2, and went back to original full face mask. She also had PFTs; continues to use ICS/LABA and LAMA.  2/23/2022 Has been doing well on Symbicort; uses 2 puffs at night; rare use of MARY. Continues to struggle with her PAP use; has significant aerophagia.  10/5/2022 Has not been tolerant of PAP; could not get a MAD because of cost. Has stopped ICS/LABA because she has been feeling well. Rare use of MARY. She was admitted at [facility] about 1 month ago; diagnosed with heart failure; started on lasix.  11/29/2022 Has not been able to tolerate PAP due to aerophagia. Uses MARY PRN; no pulmonary issues.  2/7/2023 Following up after repeat HST and asthma. Has used her albuterol twice in the last two weeks but that is all in the last month. No new sleep symptoms - reports that her sleep is better with mild intentional weight loss.  2/12/2024 Doing well from pulm perspective; rare MARY uses. Has a lot of upper airway sputum and coughs in AM. Has gained weight and feels like gasping at night.  [ESS] : 5

## 2024-02-12 NOTE — ASSESSMENT
[FreeTextEntry1] : REVIEWED 1. PFTs 10/19/2021: FEV1 94, FEV1/FVC 87, FEF % 157% change with BD, TLC 78, ERV 61, DLCO 89 2. 6MWT 10/19/2021: no desaturation; 457.2 meters walked; dyspnea PILLO 4 3. AirView 11/2021 to 2/2022: 51% adherence; average use of 11 min; therapeutic AHI 1; 95th percentile pressure of 4.7 cmH2O 4. [facility] records September 2022 5. HST 1/2023; AHI 7.6, CMS 4%; AHI 3% 14.8; nyla oxygen 91%  56 yo F, never smoker, with PMHx of asthma, HTN, GERD, DM, hx of COVID 2020, COVID vaccinated, moderate GREGORIO following up for asthma and GREGORIO.  1. Asthma, mild intermittent - Initially on ICS/LABA, now maintained off ICS/LABA on PRN albuterol. Stable w/o exacerbations. Continue to monitor off ICS.   2. GREGORIO - Previously moderate GREGORIO, then improved to mild. Now has gained weight and symptoms are worse. Will repeat HST. Previously intolerant to PAP and MAD therapies.   3. PND/cough -Trial of flonase and azelastine  RTC 2 months.

## 2024-02-27 ENCOUNTER — OUTPATIENT (OUTPATIENT)
Dept: OUTPATIENT SERVICES | Facility: HOSPITAL | Age: 59
LOS: 1 days | End: 2024-02-27
Payer: COMMERCIAL

## 2024-02-27 ENCOUNTER — APPOINTMENT (OUTPATIENT)
Dept: SLEEP CENTER | Facility: HOME HEALTH | Age: 59
End: 2024-02-27
Payer: MEDICAID

## 2024-02-27 DIAGNOSIS — Z98.51 TUBAL LIGATION STATUS: Chronic | ICD-10-CM

## 2024-02-29 DIAGNOSIS — G47.33 OBSTRUCTIVE SLEEP APNEA (ADULT) (PEDIATRIC): ICD-10-CM

## 2024-03-15 NOTE — ED PROVIDER NOTE - WR ORDER NAME 1
FAXED ON 03/15/24 TO [facility] at [phone]. FAX CONFIRMATION RECEIVED.    Xray Chest 1 View AP/PA

## 2024-03-22 ENCOUNTER — APPOINTMENT (OUTPATIENT)
Dept: PULMONOLOGY | Facility: CLINIC | Age: 59
End: 2024-03-22
Payer: MEDICAID

## 2024-03-22 DIAGNOSIS — J45.909 UNSPECIFIED ASTHMA, UNCOMPLICATED: ICD-10-CM

## 2024-03-22 DIAGNOSIS — G47.33 OBSTRUCTIVE SLEEP APNEA (ADULT) (PEDIATRIC): ICD-10-CM

## 2024-03-24 PROBLEM — G47.33 OBSTRUCTIVE SLEEP APNEA: Status: ACTIVE | Noted: 2021-07-10

## 2024-03-24 PROBLEM — J45.909 ASTHMA: Status: ACTIVE | Noted: 2021-04-13

## 2024-03-24 NOTE — END OF VISIT
[FreeTextEntry3] :   I, Marielle Duenas MD, personally performed the evaluation and management (E/M) services for this patient. That E/M includes conducting the clinically appropriate initial history &/or exam, assessing all conditions, and establishing the plan of care. I have also independently reviewed the medical records and imaging at the time of this office visit, and discussed the above mentioned images with interpretations with the patient. Today, THIERRY Stovall was here to participate in the visit & follow plan of care established by me. [Time Spent: ___ minutes] : I have spent [unfilled] minutes of time on the encounter.

## 2024-03-24 NOTE — HISTORY OF PRESENT ILLNESS
[Home] : at home, [unfilled] , at the time of the visit. [Medical Office: ([facility] [place])___] : at the medical office located in  [Verbal consent obtained from patient] : the patient, [unfilled] [TextBox_4] : 5/11/21 initial visit:  54 yo F, never smoker, with PMHx of asthma, HTN, GERD, DM who presents for SOB. States she had Covid this past December, never hospitalized or required oxygen but had multiple ED visits for SOB. After COVID resolved, her ET reduced from 10 to 2 blocks. Also reports waking up with SOB during the night that resolves with nebulizer use. She had recently presented to the ED for waking up gasping during the night and was told it was GERD and was started on PPI with some improvement in symptoms. Reports snoring but no witnessed apneas. Reports no problem with sleep initiation but has problem with sleep maintenance. She was previously following with Dr. Shweta Alexandre at [place] PN: [phone], and had PFTs but does not know the result. Patient was started on symbicort for asthma 2 years ago but has only been using it once every few weeks. Also had a negative cardiac workup.  Sleep Related Symptoms: awakes unrefreshed, difficulty maintaining sleep, frequent nocturnal awakening, snoring, but does not have difficulty initiating sleep, no witnessed apneas Parasomnia: no unusual sleep behavior ESS: 10  6/23/21: Returns for scheduled follow-up appointment. Says breathing has not been very good lately, and worsening since this past Saturday. Her son smokes hookah and she was around him on Saturday and this is when the symptoms started. Been using albuterol inhaler more often. Coughing more, but not producing phlegm. Using new symbicort controller twice a day as instructed, since last visit, and rinsing mouth after use. Has albuterol with spacer with her in the office. Denies wheezing or other abnormal sounds. Feels chest tightness when exerting herself and needs to stop to catch her breath. Thinks the weather has been playing a role. Endorses sore throat, rhinorrhea, itchy eyes during this time. Also thinks abd pain is playing a role-- describes reflux symptoms. No interval ED or UC visits since last month. No F/C, myalgias, arthralgias. No sick contacts.  Regarding HST - was supposed to have done study prior to this visit. Patient says she did use device twice but both occasions when she brought it back to sleep lab was told there was no data and pt says she must have used the device incorrectly and is planned to try it again this week via sleep .  7/9/2021 Has been doing well in terms of asthma. Finished 3 day course of prednisone. Also had a sleep study and would like to discuss results.  7/28/2021 Went to ED this morning bc of persistent 3 days of chest tightness and difficulty breathing. Was given 1 x Prednisone 50mg. Doing better now. Not in respiratory distress. Trigger unclear. She was using her ICS/LABA regularly. She thinks it was the weather. Has rhinitis as well.  7/29/2021 Following up after yesterday's ED visit. Feeling better today. Could not buy the nebulizer bc it is too expensive for her. Has continued the prescribed steroids, and is using her current inhalers.  9/21/2021 Asthma: doing okay; has episodes of more MARY use but in general has been okay. On triple therapy. GREGORIO: has started to use her PAP but is having a hard time with the mask  10/19/2021 Using PAP daily and more consistently but still having mask issues. She has perception of swallowing air on some nights. Did not like the Eson2, and went back to original full face mask. She also had PFTs; continues to use ICS/LABA and LAMA.  2/23/2022 Has been doing well on Symbicort; uses 2 puffs at night; rare use of MARY. Continues to struggle with her PAP use; has significant aerophagia.  10/5/2022 Has not been tolerant of PAP; could not get a MAD because of cost. Has stopped ICS/LABA because she has been feeling well. Rare use of MARY. She was admitted at [facility] about 1 month ago; diagnosed with heart failure; started on lasix.  11/29/2022 Has not been able to tolerate PAP due to aerophagia. Uses MARY PRN; no pulmonary issues.  2/7/2023 Following up after repeat HST and asthma. Has used her albuterol twice in the last two weeks but that is all in the last month. No new sleep symptoms - reports that her sleep is better with mild intentional weight loss.  2/12/2024 Doing well from pulm perspective; rare MARY uses. Has a lot of upper airway sputum and coughs in AM. Has gained weight and feels like gasping at night.   3/22/2024 Here to discuss HST results. Notes worsening dry mouth.  [ESS] : 5

## 2024-03-24 NOTE — ASSESSMENT
[FreeTextEntry1] : REVIEWED 1. PFTs 10/19/2021: FEV1 94, FEV1/FVC 87, FEF % 157% change with BD, TLC 78, ERV 61, DLCO 89 2. 6MWT 10/19/2021: no desaturation; 457.2 meters walked; dyspnea PILLO 4 3. AirView 11/2021 to 2/2022: 51% adherence; average use of 11 min; therapeutic AHI 1; 95th percentile pressure of 4.7 cmH2O 4. [facility] records September 2022 5. HST 1/2023; AHI 7.6, CMS 4%; AHI 3% 14.8; nyla oxygen 91% 6. 2/27/2024: AHI 17.5 (3%); AHI 10.7 (4%); t88 0 minutes  56 yo F, never smoker, with PMHx of asthma, HTN, GERD, DM, hx of COVID 2020, COVID vaccinated, moderate GREGORIO following up for asthma and GREGORIO.  1. Asthma, mild intermittent - Initially on ICS/LABA, now maintained off ICS/LABA on PRN albuterol. Stable w/o exacerbations. Continue to monitor off ICS.  2. GREGORIO - Previously moderate GREGORIO, then improved to mild. Now has gained weight and symptoms are worse, HST progressed again to moderate. The benefits of GREGORIO treatment in improving cardiac, neurologic, cognitive, and mortality outcomes were reviewed. Previously intolerant to PAP and MAD therapies and does not want to re-try treatment at this time. She will think about it and will let us know if she changes her mind. Understands risks of untreated GREGORIO.  Is motivated to lose weight to improve GREGORIO. If unable to lose weight, will discuss inspire.   3. PND/cough -Trial of flonase and azelastine  RTC 3-6 months.

## 2024-07-22 ENCOUNTER — RX RENEWAL (OUTPATIENT)
Age: 59
End: 2024-07-22

## 2024-09-05 ENCOUNTER — EMERGENCY (EMERGENCY)
Age: 59
LOS: 1 days | Discharge: HOME | End: 2024-09-05
Attending: EMERGENCY MEDICINE | Admitting: EMERGENCY MEDICINE
Payer: MEDICAID

## 2024-09-05 VITALS
SYSTOLIC BLOOD PRESSURE: 158 MMHG | TEMPERATURE: 98 F | HEIGHT: 66 IN | RESPIRATION RATE: 16 BRPM | DIASTOLIC BLOOD PRESSURE: 76 MMHG | HEART RATE: 86 BPM | OXYGEN SATURATION: 97 % | WEIGHT: 199.96 LBS

## 2024-09-05 VITALS
TEMPERATURE: 98 F | OXYGEN SATURATION: 99 % | HEART RATE: 71 BPM | RESPIRATION RATE: 15 BRPM | DIASTOLIC BLOOD PRESSURE: 77 MMHG | SYSTOLIC BLOOD PRESSURE: 123 MMHG

## 2024-09-05 DIAGNOSIS — R51.9 HEADACHE, UNSPECIFIED: ICD-10-CM

## 2024-09-05 DIAGNOSIS — E78.5 HYPERLIPIDEMIA, UNSPECIFIED: ICD-10-CM

## 2024-09-05 DIAGNOSIS — M54.2 CERVICALGIA: ICD-10-CM

## 2024-09-05 DIAGNOSIS — I10 ESSENTIAL (PRIMARY) HYPERTENSION: ICD-10-CM

## 2024-09-05 DIAGNOSIS — K21.9 GASTRO-ESOPHAGEAL REFLUX DISEASE WITHOUT ESOPHAGITIS: ICD-10-CM

## 2024-09-05 DIAGNOSIS — H57.11 OCULAR PAIN, RIGHT EYE: ICD-10-CM

## 2024-09-05 DIAGNOSIS — Z98.51 TUBAL LIGATION STATUS: Chronic | ICD-10-CM

## 2024-09-05 DIAGNOSIS — Z86.69 PERSONAL HISTORY OF OTHER DISEASES OF THE NERVOUS SYSTEM AND SENSE ORGANS: ICD-10-CM

## 2024-09-05 DIAGNOSIS — E11.9 TYPE 2 DIABETES MELLITUS WITHOUT COMPLICATIONS: ICD-10-CM

## 2024-09-05 DIAGNOSIS — J45.909 UNSPECIFIED ASTHMA, UNCOMPLICATED: ICD-10-CM

## 2024-09-05 PROCEDURE — 99284 EMERGENCY DEPT VISIT MOD MDM: CPT

## 2024-09-05 RX ORDER — KETOROLAC TROMETHAMINE 30 MG/ML
15 INJECTION, SOLUTION INTRAMUSCULAR; INTRAVENOUS ONCE
Refills: 0 | Status: DISCONTINUED | OUTPATIENT
Start: 2024-09-05 | End: 2024-09-05

## 2024-09-05 RX ORDER — METOCLOPRAMIDE HCL 10 MG
10 TABLET ORAL ONCE
Refills: 0 | Status: COMPLETED | OUTPATIENT
Start: 2024-09-05 | End: 2024-09-05

## 2024-09-05 RX ORDER — ACETAMINOPHEN 500 MG/5ML
975 LIQUID (ML) ORAL ONCE
Refills: 0 | Status: COMPLETED | OUTPATIENT
Start: 2024-09-05 | End: 2024-09-05

## 2024-10-02 ENCOUNTER — APPOINTMENT (OUTPATIENT)
Dept: NEUROLOGY | Facility: CLINIC | Age: 59
End: 2024-10-02
Payer: MEDICAID

## 2024-10-02 VITALS
BODY MASS INDEX: 34.83 KG/M2 | WEIGHT: 204 LBS | SYSTOLIC BLOOD PRESSURE: 122 MMHG | HEIGHT: 64 IN | OXYGEN SATURATION: 94 % | TEMPERATURE: 97.3 F | HEART RATE: 90 BPM | DIASTOLIC BLOOD PRESSURE: 83 MMHG

## 2024-10-02 DIAGNOSIS — R51.9 HEADACHE, UNSPECIFIED: ICD-10-CM

## 2024-10-02 NOTE — PHYSICAL EXAM
[General Appearance - Alert] : alert [General Appearance - In No Acute Distress] : in no acute distress [Oriented To Time, Place, And Person] : oriented to person, place, and time [Impaired Insight] : insight and judgment were intact [Person] : oriented to person [Place] : oriented to place [Time] : oriented to time [Fluency] : fluency intact [Cranial Nerves Optic (II)] : visual acuity intact bilaterally,  visual fields full to confrontation, pupils equal round and reactive to light [Cranial Nerves Oculomotor (III)] : extraocular motion intact [Cranial Nerves Trigeminal (V)] : facial sensation intact symmetrically [Cranial Nerves Facial (VII)] : face symmetrical [Cranial Nerves Vestibulocochlear (VIII)] : hearing was intact bilaterally [Cranial Nerves Glossopharyngeal (IX)] : tongue and palate midline [Cranial Nerves Accessory (XI - Cranial And Spinal)] : head turning and shoulder shrug symmetric [Cranial Nerves Hypoglossal (XII)] : there was no tongue deviation with protrusion [Motor Tone] : muscle tone was normal in all four extremities [Motor Strength] : muscle strength was normal in all four extremities [Sensation Tactile Decrease] : light touch was intact [Abnormal Walk] : normal gait [Balance] : balance was intact [1+] : Ankle jerk left 1+ [Coordination - Dysmetria Impaired Finger-to-Nose Bilateral] : not present

## 2024-10-02 NOTE — HISTORY OF PRESENT ILLNESS
[FreeTextEntry1] : Interim Hx: 10/2/2024 Accompanied by son (provides translation)  Headaches continue to be frequent, near daily. Worse in AM Also with blurriness in left eye. Headaches similar to previous ones. No new features Saw optho 6 months ago and reportedly exam was unremarkable Went to  ER 9/5 for headache, felt better after IV meds given Never started TPM after last visit   Repeat sleep study in Feb showed GREGORIO progressed again to moderate. She was advised t start PAP _____________________ Interim Hx: 10/5/2023  Headache frequency increased again Now 4x/week, worse in AM. Sometimes responds to Tylenol She reports she was told no longer needs CPAP since sleep apnea improved  MRV head negative ___________________ Interim Hx: 2/23/2023  Headaches now 4x/month, brief. will take Tylenol  Did not see ENT- PMD prescribed acetic acid  MRV not completed   Saw Dr Duenas for GREGORIO- repeat sleep study improved with weight loss. ____________________ Interim Hx: 10/13/2022  Overall headache have been better but past two weeks waking up with headaches, lasts few others and goes away. Does not take anything Sleep apnea- PAP recently restarted but patient states she still can not tolerate it She was also admitted to [facility] for CHF for 2 days and reports headaches were bad during hospitalization. She mentions she has been having pulsating pressure behind left eye for past 2 weeks. In addition c/o ringing in ears R>L  Wearing wrist splints for CTS which she reports is better overall Neck pain also improved. Did not do PT  ____________________ Interim Hx: 6/2/2022  Headaches now better, occurring 2x/month. Not taking nortriptyline Now c/o of non-radiating left sided neck pain, takes ibuprofen Has not done physical therapy  GREGORIO- trouble using machine, bothers stomach   Wearing wrist splints for CTS which she reports is better overall  Off steriods _____________________ Interim Hx: 9/16/2021  Headaches occurring now avg 3x/week in morning. Sleep study confirmed moderate sleep apnea, has been using CPAP for 2 months. Overall is feeling better but still taking Tylenol few  times /week   Saw podiatrist for feet pain. She reports she has arthritis and has put on a course of steroids. Glucose readings have been high 2/2 to steroids which she believes is making her headaches worse. She was be completed with steroids in a week.  Also follow up with Hand specialist for CTS who recommended wrist splints, and will consider injection vs surgery if no improvement.  __________________ Interim Hx: 6/17/2021  Patient states that headaches have somewhat returned but not has intense. She saw Dr. Duenas and was scheduled for sleep study but did not complete.  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 No evidence of polyneuropathy  Patient continues to have pain in legs R>L. Now described as posterior pain around Achilles tendon No significant improvement with Neurontin- takes on occasion, states it makes her feel strange ______________________ Interim Hx: 4/15/2021  Overall headaches have improved- no longer having frontal pain mainly now posterior and neck region.  Saw optho- no evidence of papilledema  Also went to the ER for new onset  pain/burning/tingling in feet for few weeks. Prescribed neurontin 300mg TID. SHe has only been taking BID bc it makes her tired and as needed. She does feel as if it helps. In addition she reports  pain and numbness in b/l hands for 2 years, worse when sleeping, now dropping items from hands.   _________________ Initial Hx: 3/11/2021 56 yo F presents for evaluation of headaches  Patient states that she has history of headaches that started few years prior that improved after  LP in Oct 2019. She was not on oral medication.  In December 2020 she had covid and now headaches are more severe  Previous to LP she was having headaches 1x/week, after frequency was about 1x/month. Currently headache frequency is 3/week. When pain is severe for a few seconds she will not know where is she. Duration - almost full day description- frontal and posterior region +light and noise sensitivity, nausea Denies vision problems, double vision.  On 2/16/2021 went to  ER for headache. CT head- no acute findings  Problem list from [facility] reviewed- has been following with Neurology States history of idiopathic intracranial hypertension (resolved)- lumbar puncture  sleeps- avg 5 hours recently water intake - a lot  caffeine- 3 cups /day

## 2024-10-02 NOTE — DISCUSSION/SUMMARY
[FreeTextEntry1] : 57 yo F presents for evaluation of fluctuating headaches, history of reported IIH few years ago which improved after LP. MRI Brain unremarkable, optho eval- no papilledema Also with pain/numbness in b/l hands for few years and recent pain in feet 6/16/2021 EMG/NCS- moderate to severe b/l CTS. No evidence of polyneuropathy  plan: Trial of low dose TPM  Discussed importance of treating sleep apnea and reviewed that it can contribute to morning headaches Optho follow up F/u in 3-4 months

## 2024-10-08 NOTE — ED PROVIDER NOTE - LANGUAGE ASSISTANCE NEEDED
Pt states that baby movements have slowed down a lot. She is having a lot of pressure and back pain.     Yes-Patient/Caregiver accepts free interpretation services...

## 2024-11-19 ENCOUNTER — RX RENEWAL (OUTPATIENT)
Age: 59
End: 2024-11-19

## 2025-01-28 PROBLEM — E78.5 HYPERLIPIDEMIA, UNSPECIFIED: Chronic | Status: ACTIVE | Noted: 2021-01-12

## 2025-01-28 PROBLEM — I10 ESSENTIAL (PRIMARY) HYPERTENSION: Chronic | Status: ACTIVE | Noted: 2021-01-07

## 2025-01-28 PROBLEM — J45.909 UNSPECIFIED ASTHMA, UNCOMPLICATED: Chronic | Status: ACTIVE | Noted: 2021-01-07

## 2025-01-28 PROBLEM — E11.9 TYPE 2 DIABETES MELLITUS WITHOUT COMPLICATIONS: Chronic | Status: ACTIVE | Noted: 2021-01-07

## 2025-03-07 ENCOUNTER — EMERGENCY (EMERGENCY)
Facility: HOSPITAL | Age: 60
LOS: 1 days | Discharge: HOME | End: 2025-03-07
Attending: EMERGENCY MEDICINE | Admitting: EMERGENCY MEDICINE
Payer: MEDICAID

## 2025-03-07 VITALS
SYSTOLIC BLOOD PRESSURE: 122 MMHG | DIASTOLIC BLOOD PRESSURE: 69 MMHG | OXYGEN SATURATION: 97 % | HEART RATE: 77 BPM | TEMPERATURE: 98 F | RESPIRATION RATE: 16 BRPM

## 2025-03-07 VITALS
RESPIRATION RATE: 18 BRPM | SYSTOLIC BLOOD PRESSURE: 181 MMHG | TEMPERATURE: 98 F | HEART RATE: 88 BPM | DIASTOLIC BLOOD PRESSURE: 85 MMHG | OXYGEN SATURATION: 98 %

## 2025-03-07 DIAGNOSIS — Z98.51 TUBAL LIGATION STATUS: Chronic | ICD-10-CM

## 2025-03-07 DIAGNOSIS — R07.89 OTHER CHEST PAIN: ICD-10-CM

## 2025-03-07 DIAGNOSIS — I10 ESSENTIAL (PRIMARY) HYPERTENSION: ICD-10-CM

## 2025-03-07 DIAGNOSIS — R42 DIZZINESS AND GIDDINESS: ICD-10-CM

## 2025-03-07 DIAGNOSIS — E11.9 TYPE 2 DIABETES MELLITUS WITHOUT COMPLICATIONS: ICD-10-CM

## 2025-03-07 LAB
ALBUMIN SERPL ELPH-MCNC: 3.5 G/DL — SIGNIFICANT CHANGE UP (ref 3.4–5)
ALP SERPL-CCNC: 123 U/L — HIGH (ref 40–120)
ALT FLD-CCNC: 30 U/L — SIGNIFICANT CHANGE UP (ref 12–42)
ANION GAP SERPL CALC-SCNC: 9 MMOL/L — SIGNIFICANT CHANGE UP (ref 9–16)
AST SERPL-CCNC: 23 U/L — SIGNIFICANT CHANGE UP (ref 15–37)
BASOPHILS # BLD AUTO: 0.04 K/UL — SIGNIFICANT CHANGE UP (ref 0–0.2)
BASOPHILS NFR BLD AUTO: 0.3 % — SIGNIFICANT CHANGE UP (ref 0–2)
BILIRUB SERPL-MCNC: 0.4 MG/DL — SIGNIFICANT CHANGE UP (ref 0.2–1.2)
BUN SERPL-MCNC: 10 MG/DL — SIGNIFICANT CHANGE UP (ref 7–23)
CALCIUM SERPL-MCNC: 9.7 MG/DL — SIGNIFICANT CHANGE UP (ref 8.5–10.5)
CHLORIDE SERPL-SCNC: 104 MMOL/L — SIGNIFICANT CHANGE UP (ref 96–108)
CO2 SERPL-SCNC: 25 MMOL/L — SIGNIFICANT CHANGE UP (ref 22–31)
CREAT SERPL-MCNC: 0.74 MG/DL — SIGNIFICANT CHANGE UP (ref 0.5–1.3)
EGFR: 92 ML/MIN/1.73M2 — SIGNIFICANT CHANGE UP
EOSINOPHIL # BLD AUTO: 0.06 K/UL — SIGNIFICANT CHANGE UP (ref 0–0.5)
EOSINOPHIL NFR BLD AUTO: 0.5 % — SIGNIFICANT CHANGE UP (ref 0–6)
GLUCOSE SERPL-MCNC: 154 MG/DL — HIGH (ref 70–99)
HCT VFR BLD CALC: 38.9 % — SIGNIFICANT CHANGE UP (ref 34.5–45)
HGB BLD-MCNC: 12.8 G/DL — SIGNIFICANT CHANGE UP (ref 11.5–15.5)
IMM GRANULOCYTES # BLD AUTO: 0.03 K/UL — SIGNIFICANT CHANGE UP (ref 0–0.07)
IMM GRANULOCYTES NFR BLD AUTO: 0.3 % — SIGNIFICANT CHANGE UP (ref 0–0.9)
LYMPHOCYTES # BLD AUTO: 2.49 K/UL — SIGNIFICANT CHANGE UP (ref 1–3.3)
LYMPHOCYTES NFR BLD AUTO: 21.6 % — SIGNIFICANT CHANGE UP (ref 13–44)
MCHC RBC-ENTMCNC: 27.6 PG — SIGNIFICANT CHANGE UP (ref 27–34)
MCHC RBC-ENTMCNC: 32.9 G/DL — SIGNIFICANT CHANGE UP (ref 32–36)
MCV RBC AUTO: 83.8 FL — SIGNIFICANT CHANGE UP (ref 80–100)
MONOCYTES # BLD AUTO: 0.61 K/UL — SIGNIFICANT CHANGE UP (ref 0–0.9)
MONOCYTES NFR BLD AUTO: 5.3 % — SIGNIFICANT CHANGE UP (ref 2–14)
NEUTROPHILS # BLD AUTO: 8.29 K/UL — HIGH (ref 1.8–7.4)
NEUTROPHILS NFR BLD AUTO: 72 % — SIGNIFICANT CHANGE UP (ref 43–77)
NRBC # BLD AUTO: 0 K/UL — SIGNIFICANT CHANGE UP (ref 0–0)
NRBC # FLD: 0 K/UL — SIGNIFICANT CHANGE UP (ref 0–0)
NRBC BLD AUTO-RTO: 0 /100 WBCS — SIGNIFICANT CHANGE UP (ref 0–0)
NT-PROBNP SERPL-SCNC: 55 PG/ML — SIGNIFICANT CHANGE UP
PLATELET # BLD AUTO: 245 K/UL — SIGNIFICANT CHANGE UP (ref 150–400)
PMV BLD: 10.3 FL — SIGNIFICANT CHANGE UP (ref 7–13)
POTASSIUM SERPL-MCNC: 3.8 MMOL/L — SIGNIFICANT CHANGE UP (ref 3.5–5.3)
POTASSIUM SERPL-SCNC: 3.8 MMOL/L — SIGNIFICANT CHANGE UP (ref 3.5–5.3)
PROT SERPL-MCNC: 7.4 G/DL — SIGNIFICANT CHANGE UP (ref 6.4–8.2)
RBC # BLD: 4.64 M/UL — SIGNIFICANT CHANGE UP (ref 3.8–5.2)
RBC # FLD: 13.2 % — SIGNIFICANT CHANGE UP (ref 10.3–14.5)
SODIUM SERPL-SCNC: 138 MMOL/L — SIGNIFICANT CHANGE UP (ref 132–145)
TROPONIN I, HIGH SENSITIVITY RESULT: 4.7 NG/L — SIGNIFICANT CHANGE UP
WBC # BLD: 11.52 K/UL — HIGH (ref 3.8–10.5)
WBC # FLD AUTO: 11.52 K/UL — HIGH (ref 3.8–10.5)

## 2025-03-07 PROCEDURE — 71045 X-RAY EXAM CHEST 1 VIEW: CPT | Mod: 26

## 2025-03-07 PROCEDURE — 70450 CT HEAD/BRAIN W/O DYE: CPT | Mod: 26

## 2025-03-07 PROCEDURE — 99284 EMERGENCY DEPT VISIT MOD MDM: CPT

## 2025-03-07 RX ORDER — ONDANSETRON HCL/PF 4 MG/2 ML
1 VIAL (ML) INJECTION
Qty: 1 | Refills: 0
Start: 2025-03-07

## 2025-03-07 RX ORDER — MECLIZINE HCL 12.5 MG
1 TABLET ORAL
Qty: 12 | Refills: 0
Start: 2025-03-07

## 2025-03-07 RX ORDER — ONDANSETRON HCL/PF 4 MG/2 ML
4 VIAL (ML) INJECTION ONCE
Refills: 0 | Status: COMPLETED | OUTPATIENT
Start: 2025-03-07 | End: 2025-03-07

## 2025-03-07 RX ORDER — MAGNESIUM, ALUMINUM HYDROXIDE 200-200 MG
30 TABLET,CHEWABLE ORAL ONCE
Refills: 0 | Status: COMPLETED | OUTPATIENT
Start: 2025-03-07 | End: 2025-03-07

## 2025-03-07 RX ORDER — MECLIZINE HCL 12.5 MG
25 TABLET ORAL ONCE
Refills: 0 | Status: COMPLETED | OUTPATIENT
Start: 2025-03-07 | End: 2025-03-07

## 2025-03-07 RX ADMIN — Medication 25 MILLIGRAM(S): at 12:03

## 2025-03-07 RX ADMIN — Medication 30 MILLILITER(S): at 14:44

## 2025-03-07 RX ADMIN — Medication 4 MILLIGRAM(S): at 12:41

## 2025-03-07 RX ADMIN — Medication 1000 MILLILITER(S): at 12:03

## 2025-03-07 RX ADMIN — Medication 20 MILLIGRAM(S): at 14:44

## 2025-03-07 RX ADMIN — Medication 1000 MILLILITER(S): at 13:33

## 2025-03-07 NOTE — ED ADULT TRIAGE NOTE - CHIEF COMPLAINT QUOTE
yesterday pt felt both ears "were stuffy" with assoc dizziness, felt "off balance" took losartan today ( and daily) as bp high this am, befast negative, denies pain

## 2025-03-07 NOTE — ED ADULT NURSE NOTE - NSFALLRISKINTERV_ED_ALL_ED

## 2025-03-07 NOTE — ED PROVIDER NOTE - PATIENT PORTAL LINK FT
You can access the FollowMyHealth Patient Portal offered by [facility] by registering at the following website: [email or web address]. By joining [facility]’s FollowMyHealth portal, you will also be able to view your health information using other applications (apps) compatible with our system.

## 2025-03-07 NOTE — ED PROVIDER NOTE - CLINICAL SUMMARY MEDICAL DECISION MAKING FREE TEXT BOX
In summary, this is an elderly female who is presenting with acute onset vertigo, has previously been diagnosed with peripheral vertigo in the past.  She feels some instability with gait, will assess with CT scan of head, check labs including cardiac enzyme.    EKG performed at 11:03 AM independently reviewed by me, showing normal sinus rhythm with a ventricular rate of 77 bpm, LA interval of 136 ms, QRS duration of 78 ms, QTc (Baz) of 400 ms. There are no ST segment changes and no T wave abnormalities.

## 2025-03-07 NOTE — ED PROVIDER NOTE - NSFOLLOWUPINSTRUCTIONS_ED_ALL_ED_FT
Usted fue atendido en el departamento de emergencias por mareos y opresión en el pecho. Realizamos pruebas de laboratorio, aarti tomografía computarizada y le administramos medicamentos e infusiones.    Por favor, ping un seguimiento con hernandez médico de cabecera y hernandez cardiólogo para discutir hernandez estadía en el departamento. Debe continuar tomando todos los medicamentos según lo prescrito.    Se incluye aarti derivación en malissa documento para un otorrinolaringólogo (ENT).    Regrese al departamento de emergencias si presenta dificultad para hablar, caída facial, entumecimiento/hormigueo o debilidad en peter brazos o piernas.    --------------------    You were seen in the emergency department for dizziness and chest tightness.  We performed lab test, CT scan, and gave you medications and fluids.    Please follow-up with your primary medical doctor and cardiologist to discuss your stay in the department.  You should continue taking all medications as prescribed.    A referral is included in this paperwork for an otolaryngologist (ENT).    Return to the emergency department if you have slurred speech, facial droop, numbness/ting/weakness in your arms or legs.

## 2025-03-07 NOTE — ED ADULT NURSE NOTE - OBJECTIVE STATEMENT
Pt with Hx vertigo C/O high BP and dizziness since yesterday. High BP in triage. Describes symptoms as room spinning, worse with postural change.

## 2025-03-07 NOTE — ED PROVIDER NOTE - ATTENDING CONTRIBUTION TO CARE
58yo F hx of HTN, DM, remote hx of peripheral vertigo, presents with intermittent sensation of room spinning and lightheadedness triggered by change in position today and yesterday, in setting of nausea, vomiting, and diarrhea yesterday.  Vomiting and diarrhea has resolved.  Vomiting was somewhat triggered by sensation of room spinning.  On exam afebrile, VSS, well appearing, abd soft NDNT.  TMs nml b/l.  Nml finger to nose, nml heel to shin, nml gait.  Suspect peripheral vertigo vs. dehydration in setting of gastroenteritis vs. food poisoning.  labs unrevealing.  CT head neg.  CXR clear.  Feels well after IV hydration and meclizine.  Stable for dc w follow up with ENT.

## 2025-03-07 NOTE — ED PROVIDER NOTE - OBJECTIVE STATEMENT
59-year-old female with past medical history of hypertension, non-insulin-dependent diabetes mellitus, remote history of peripheral vertigo managed with ENT, presents to the emergency department with complaint of acute onset dizziness since yesterday which she describes as room spinning when she stands up.  She called her primary doctor this morning who instructed her to check her blood pressure, she noted it to be 190s systolic.  She endorses intermittent chest tightness mostly with walking.  She last saw her cardiologist 1 year ago and had a normal stress test.  At this time, she denies any shortness of breath, has no new leg swelling, no recent travel.

## 2025-03-07 NOTE — ED PROVIDER NOTE - PHYSICAL EXAMINATION
General: alert, oriented to person, time, place  Psych: mood appropriate  Head: normocephalic; atraumatic  Eyes: conjunctivae clear bilaterally, sclerae anicteric  ENT: no nasal flaring, patent nares  Cardio: RRR, no m/r/g, pulses 2+ b/l  Resp: CATB, no w/r/r  GI: soft/nondistended/nontender  Neuro: strength 5/5 in upper and lower extremities; sensation intact to light touch in all dermatomes; CN II-XII intact; normal gait; no dysmetria  Skin: No evidence of rash or bruising  MSK: normal movement of all extremities  Lymph/Vasc: no LE edema General: alert, oriented to person, time, place  Psych: mood appropriate  Head: normocephalic; atraumatic  Eyes: conjunctivae clear bilaterally, sclerae anicteric  ENT: tympanic membrane's intact bilaterally, no effusion or hemotympanum noted  Cardio: RRR, no m/r/g, pulses 2+ b/l  Resp: CATB, no w/r/r  GI: soft/nondistended/nontender  Neuro: strength 5/5 in upper and lower extremities; sensation intact to light touch in all dermatomes; CN II-XII intact; normal gait; no dysmetria  Skin: No evidence of rash or bruising  MSK: normal movement of all extremities  Lymph/Vasc: no LE edema

## 2025-03-07 NOTE — ED PROVIDER NOTE - PROGRESS NOTE DETAILS
Ajit Hernandez MD: Patient symptoms now improved after fluids, meclizine, Zofran.  CT head unremarkable.  Lab tests nonactionable.

## 2025-03-13 ENCOUNTER — APPOINTMENT (OUTPATIENT)
Dept: OTOLARYNGOLOGY | Facility: CLINIC | Age: 60
End: 2025-03-13
Payer: MEDICAID

## 2025-03-13 VITALS
BODY MASS INDEX: 36.22 KG/M2 | TEMPERATURE: 97.8 F | HEIGHT: 64 IN | WEIGHT: 212.13 LBS | SYSTOLIC BLOOD PRESSURE: 130 MMHG | DIASTOLIC BLOOD PRESSURE: 84 MMHG | HEART RATE: 82 BPM | OXYGEN SATURATION: 97 %

## 2025-03-13 DIAGNOSIS — M26.629 ARTHRALGIA OF TEMPOROMANDIBULAR JOINT,: ICD-10-CM

## 2025-03-13 DIAGNOSIS — F45.8 OTHER SOMATOFORM DISORDERS: ICD-10-CM

## 2025-03-13 DIAGNOSIS — H90.3 SENSORINEURAL HEARING LOSS, BILATERAL: ICD-10-CM

## 2025-03-13 DIAGNOSIS — R42 DIZZINESS AND GIDDINESS: ICD-10-CM

## 2025-03-13 DIAGNOSIS — G47.33 OBSTRUCTIVE SLEEP APNEA (ADULT) (PEDIATRIC): ICD-10-CM

## 2025-03-13 DIAGNOSIS — H93.11 TINNITUS, RIGHT EAR: ICD-10-CM

## 2025-03-13 PROCEDURE — 92550 TYMPANOMETRY & REFLEX THRESH: CPT | Mod: 52

## 2025-03-13 PROCEDURE — 92504 EAR MICROSCOPY EXAMINATION: CPT

## 2025-03-13 PROCEDURE — 92557 COMPREHENSIVE HEARING TEST: CPT

## 2025-03-13 PROCEDURE — 99205 OFFICE O/P NEW HI 60 MIN: CPT | Mod: 25

## 2025-03-13 PROCEDURE — 31575 DIAGNOSTIC LARYNGOSCOPY: CPT

## 2025-03-18 ENCOUNTER — APPOINTMENT (OUTPATIENT)
Dept: OTOLARYNGOLOGY | Facility: CLINIC | Age: 60
End: 2025-03-18
Payer: MEDICAID

## 2025-03-18 ENCOUNTER — APPOINTMENT (OUTPATIENT)
Dept: PHARMACY | Facility: CLINIC | Age: 60
End: 2025-03-18
Payer: MEDICAID

## 2025-03-18 PROCEDURE — V5010 ASSESSMENT FOR HEARING AID: CPT | Mod: NC

## 2025-03-31 ENCOUNTER — APPOINTMENT (OUTPATIENT)
Dept: MRI IMAGING | Facility: CLINIC | Age: 60
End: 2025-03-31
Payer: MEDICAID

## 2025-03-31 ENCOUNTER — OUTPATIENT (OUTPATIENT)
Dept: OUTPATIENT SERVICES | Facility: HOSPITAL | Age: 60
LOS: 1 days | End: 2025-03-31

## 2025-03-31 DIAGNOSIS — Z98.51 TUBAL LIGATION STATUS: Chronic | ICD-10-CM

## 2025-03-31 PROCEDURE — 70553 MRI BRAIN STEM W/O & W/DYE: CPT | Mod: 26

## 2025-04-03 ENCOUNTER — APPOINTMENT (OUTPATIENT)
Dept: OTOLARYNGOLOGY | Facility: CLINIC | Age: 60
End: 2025-04-03
Payer: MEDICAID

## 2025-04-03 VITALS
HEIGHT: 65 IN | WEIGHT: 212.25 LBS | TEMPERATURE: 97.8 F | OXYGEN SATURATION: 96 % | DIASTOLIC BLOOD PRESSURE: 73 MMHG | SYSTOLIC BLOOD PRESSURE: 123 MMHG | BODY MASS INDEX: 35.36 KG/M2 | HEART RATE: 80 BPM

## 2025-04-03 DIAGNOSIS — J30.89 OTHER ALLERGIC RHINITIS: ICD-10-CM

## 2025-04-03 DIAGNOSIS — G43.909 MIGRAINE, UNSPECIFIED, NOT INTRACTABLE, W/OUT STATUS MIGRAINOSUS: ICD-10-CM

## 2025-04-03 DIAGNOSIS — H90.3 SENSORINEURAL HEARING LOSS, BILATERAL: ICD-10-CM

## 2025-04-03 DIAGNOSIS — R42 DIZZINESS AND GIDDINESS: ICD-10-CM

## 2025-04-03 DIAGNOSIS — H93.13 TINNITUS, BILATERAL: ICD-10-CM

## 2025-04-03 DIAGNOSIS — G47.33 OBSTRUCTIVE SLEEP APNEA (ADULT) (PEDIATRIC): ICD-10-CM

## 2025-04-03 PROCEDURE — 99214 OFFICE O/P EST MOD 30 MIN: CPT

## 2025-04-03 PROCEDURE — G2211 COMPLEX E/M VISIT ADD ON: CPT | Mod: NC

## 2025-04-03 RX ORDER — MAGNESIUM OXIDE 241.3 MG/1000MG
400 TABLET ORAL DAILY
Qty: 90 | Refills: 3 | Status: ACTIVE | COMMUNITY
Start: 2025-04-03 | End: 1900-01-01

## 2025-04-21 NOTE — ED ADULT NURSE NOTE - CAS ELECT INFOMATION PROVIDED
Called pt; no answer; could not confirm or leave a message due to line kept ringing; I was calling to confirm pt's virtual EAWV appointment for 4/23/25 at 9:30am and see if pt needed any help with the setting up for virtual appt or e-pre check; sent message through portal  
DC instructions

## 2025-05-06 ENCOUNTER — APPOINTMENT (OUTPATIENT)
Dept: PULMONOLOGY | Facility: CLINIC | Age: 60
End: 2025-05-06
Payer: MEDICAID

## 2025-05-06 VITALS
TEMPERATURE: 98.2 F | OXYGEN SATURATION: 97 % | HEIGHT: 65 IN | WEIGHT: 210.19 LBS | SYSTOLIC BLOOD PRESSURE: 103 MMHG | HEART RATE: 79 BPM | DIASTOLIC BLOOD PRESSURE: 70 MMHG | BODY MASS INDEX: 35.02 KG/M2

## 2025-05-06 DIAGNOSIS — G47.33 OBSTRUCTIVE SLEEP APNEA (ADULT) (PEDIATRIC): ICD-10-CM

## 2025-05-06 DIAGNOSIS — J45.909 UNSPECIFIED ASTHMA, UNCOMPLICATED: ICD-10-CM

## 2025-05-06 PROCEDURE — 99214 OFFICE O/P EST MOD 30 MIN: CPT

## 2025-05-20 ENCOUNTER — APPOINTMENT (OUTPATIENT)
Dept: PHARMACY | Facility: CLINIC | Age: 60
End: 2025-05-20
Payer: MEDICAID

## 2025-05-20 PROCEDURE — V5261I: CUSTOM

## 2025-06-12 ENCOUNTER — APPOINTMENT (OUTPATIENT)
Dept: OTOLARYNGOLOGY | Facility: CLINIC | Age: 60
End: 2025-06-12
Payer: MEDICAID

## 2025-06-12 VITALS
TEMPERATURE: 98.1 F | HEART RATE: 87 BPM | HEIGHT: 65 IN | DIASTOLIC BLOOD PRESSURE: 71 MMHG | OXYGEN SATURATION: 98 % | WEIGHT: 210 LBS | SYSTOLIC BLOOD PRESSURE: 106 MMHG | BODY MASS INDEX: 34.99 KG/M2

## 2025-06-12 PROCEDURE — 92556 SPEECH AUDIOMETRY COMPLETE: CPT | Mod: 52

## 2025-06-12 PROCEDURE — 92504 EAR MICROSCOPY EXAMINATION: CPT

## 2025-06-12 PROCEDURE — 99214 OFFICE O/P EST MOD 30 MIN: CPT | Mod: 25

## 2025-06-12 PROCEDURE — 92567 TYMPANOMETRY: CPT

## 2025-06-16 ENCOUNTER — APPOINTMENT (OUTPATIENT)
Dept: PHARMACY | Facility: CLINIC | Age: 60
End: 2025-06-16

## 2025-07-31 ENCOUNTER — EMERGENCY (EMERGENCY)
Age: 60
LOS: 1 days | End: 2025-07-31
Attending: EMERGENCY MEDICINE | Admitting: STUDENT IN AN ORGANIZED HEALTH CARE EDUCATION/TRAINING PROGRAM
Payer: MEDICAID

## 2025-07-31 VITALS
OXYGEN SATURATION: 98 % | HEART RATE: 113 BPM | TEMPERATURE: 98 F | RESPIRATION RATE: 18 BRPM | DIASTOLIC BLOOD PRESSURE: 65 MMHG | SYSTOLIC BLOOD PRESSURE: 93 MMHG

## 2025-07-31 VITALS
HEART RATE: 81 BPM | TEMPERATURE: 98 F | DIASTOLIC BLOOD PRESSURE: 73 MMHG | RESPIRATION RATE: 18 BRPM | OXYGEN SATURATION: 96 % | SYSTOLIC BLOOD PRESSURE: 116 MMHG

## 2025-07-31 DIAGNOSIS — E11.9 TYPE 2 DIABETES MELLITUS WITHOUT COMPLICATIONS: ICD-10-CM

## 2025-07-31 DIAGNOSIS — R19.7 DIARRHEA, UNSPECIFIED: ICD-10-CM

## 2025-07-31 DIAGNOSIS — Z98.51 TUBAL LIGATION STATUS: Chronic | ICD-10-CM

## 2025-07-31 DIAGNOSIS — R10.13 EPIGASTRIC PAIN: ICD-10-CM

## 2025-07-31 DIAGNOSIS — R11.2 NAUSEA WITH VOMITING, UNSPECIFIED: ICD-10-CM

## 2025-07-31 DIAGNOSIS — R10.12 LEFT UPPER QUADRANT PAIN: ICD-10-CM

## 2025-07-31 DIAGNOSIS — Z79.85 LONG-TERM (CURRENT) USE OF INJECTABLE NON-INSULIN ANTIDIABETIC DRUGS: ICD-10-CM

## 2025-07-31 LAB
ALBUMIN SERPL ELPH-MCNC: 2.8 G/DL — LOW (ref 3.4–5)
ALBUMIN SERPL ELPH-MCNC: 3.9 G/DL — SIGNIFICANT CHANGE UP (ref 3.4–5)
ALP SERPL-CCNC: 123 U/L — HIGH (ref 40–120)
ALP SERPL-CCNC: 87 U/L — SIGNIFICANT CHANGE UP (ref 40–120)
ALT FLD-CCNC: 21 U/L — SIGNIFICANT CHANGE UP (ref 12–42)
ALT FLD-CCNC: 31 U/L — SIGNIFICANT CHANGE UP (ref 12–42)
ANION GAP SERPL CALC-SCNC: 10 MMOL/L — SIGNIFICANT CHANGE UP (ref 9–16)
ANION GAP SERPL CALC-SCNC: 7 MMOL/L — LOW (ref 9–16)
AST SERPL-CCNC: 13 U/L — LOW (ref 15–37)
AST SERPL-CCNC: 22 U/L — SIGNIFICANT CHANGE UP (ref 15–37)
BASOPHILS # BLD AUTO: 0.01 K/UL — SIGNIFICANT CHANGE UP (ref 0–0.2)
BASOPHILS # BLD AUTO: 0.03 K/UL — SIGNIFICANT CHANGE UP (ref 0–0.2)
BASOPHILS NFR BLD AUTO: 0.1 % — SIGNIFICANT CHANGE UP (ref 0–2)
BASOPHILS NFR BLD AUTO: 0.1 % — SIGNIFICANT CHANGE UP (ref 0–2)
BILIRUB DIRECT SERPL-MCNC: 0.2 MG/DL — SIGNIFICANT CHANGE UP (ref 0–0.3)
BILIRUB INDIRECT FLD-MCNC: 0.4 MG/DL — SIGNIFICANT CHANGE UP (ref 0.2–1)
BILIRUB SERPL-MCNC: 0.4 MG/DL — SIGNIFICANT CHANGE UP (ref 0.2–1.2)
BILIRUB SERPL-MCNC: 0.6 MG/DL — SIGNIFICANT CHANGE UP (ref 0.2–1.2)
BUN SERPL-MCNC: 15 MG/DL — SIGNIFICANT CHANGE UP (ref 7–23)
BUN SERPL-MCNC: 17 MG/DL — SIGNIFICANT CHANGE UP (ref 7–23)
CALCIUM SERPL-MCNC: 8.6 MG/DL — SIGNIFICANT CHANGE UP (ref 8.5–10.5)
CALCIUM SERPL-MCNC: 9.8 MG/DL — SIGNIFICANT CHANGE UP (ref 8.5–10.5)
CHLORIDE SERPL-SCNC: 101 MMOL/L — SIGNIFICANT CHANGE UP (ref 96–108)
CHLORIDE SERPL-SCNC: 106 MMOL/L — SIGNIFICANT CHANGE UP (ref 96–108)
CO2 SERPL-SCNC: 23 MMOL/L — SIGNIFICANT CHANGE UP (ref 22–31)
CO2 SERPL-SCNC: 24 MMOL/L — SIGNIFICANT CHANGE UP (ref 22–31)
CREAT SERPL-MCNC: 0.72 MG/DL — SIGNIFICANT CHANGE UP (ref 0.5–1.3)
CREAT SERPL-MCNC: 1.29 MG/DL — SIGNIFICANT CHANGE UP (ref 0.5–1.3)
EGFR: 47 ML/MIN/1.73M2 — LOW
EGFR: 47 ML/MIN/1.73M2 — LOW
EGFR: 95 ML/MIN/1.73M2 — SIGNIFICANT CHANGE UP
EGFR: 95 ML/MIN/1.73M2 — SIGNIFICANT CHANGE UP
EOSINOPHIL # BLD AUTO: 0.01 K/UL — SIGNIFICANT CHANGE UP (ref 0–0.5)
EOSINOPHIL # BLD AUTO: 0.07 K/UL — SIGNIFICANT CHANGE UP (ref 0–0.5)
EOSINOPHIL NFR BLD AUTO: 0.1 % — SIGNIFICANT CHANGE UP (ref 0–6)
EOSINOPHIL NFR BLD AUTO: 0.3 % — SIGNIFICANT CHANGE UP (ref 0–6)
GLUCOSE SERPL-MCNC: 102 MG/DL — HIGH (ref 70–99)
GLUCOSE SERPL-MCNC: 197 MG/DL — HIGH (ref 70–99)
HCT VFR BLD CALC: 35.2 % — SIGNIFICANT CHANGE UP (ref 34.5–45)
HCT VFR BLD CALC: 45 % — SIGNIFICANT CHANGE UP (ref 34.5–45)
HGB BLD-MCNC: 11.5 G/DL — SIGNIFICANT CHANGE UP (ref 11.5–15.5)
HGB BLD-MCNC: 14.6 G/DL — SIGNIFICANT CHANGE UP (ref 11.5–15.5)
IMM GRANULOCYTES # BLD AUTO: 0.04 K/UL — SIGNIFICANT CHANGE UP (ref 0–0.07)
IMM GRANULOCYTES # BLD AUTO: 0.07 K/UL — SIGNIFICANT CHANGE UP (ref 0–0.07)
IMM GRANULOCYTES NFR BLD AUTO: 0.3 % — SIGNIFICANT CHANGE UP (ref 0–0.9)
IMM GRANULOCYTES NFR BLD AUTO: 0.4 % — SIGNIFICANT CHANGE UP (ref 0–0.9)
LIDOCAIN IGE QN: 16 U/L — SIGNIFICANT CHANGE UP (ref 16–77)
LIDOCAIN IGE QN: 32 U/L — SIGNIFICANT CHANGE UP (ref 16–77)
LYMPHOCYTES # BLD AUTO: 0.65 K/UL — LOW (ref 1–3.3)
LYMPHOCYTES # BLD AUTO: 1.81 K/UL — SIGNIFICANT CHANGE UP (ref 1–3.3)
LYMPHOCYTES NFR BLD AUTO: 16.8 % — SIGNIFICANT CHANGE UP (ref 13–44)
LYMPHOCYTES NFR BLD AUTO: 3.1 % — LOW (ref 13–44)
MCHC RBC-ENTMCNC: 27.7 PG — SIGNIFICANT CHANGE UP (ref 27–34)
MCHC RBC-ENTMCNC: 27.8 PG — SIGNIFICANT CHANGE UP (ref 27–34)
MCHC RBC-ENTMCNC: 32.4 G/DL — SIGNIFICANT CHANGE UP (ref 32–36)
MCHC RBC-ENTMCNC: 32.7 G/DL — SIGNIFICANT CHANGE UP (ref 32–36)
MCV RBC AUTO: 84.8 FL — SIGNIFICANT CHANGE UP (ref 80–100)
MCV RBC AUTO: 85.6 FL — SIGNIFICANT CHANGE UP (ref 80–100)
MONOCYTES # BLD AUTO: 0.59 K/UL — SIGNIFICANT CHANGE UP (ref 0–0.9)
MONOCYTES # BLD AUTO: 1.08 K/UL — HIGH (ref 0–0.9)
MONOCYTES NFR BLD AUTO: 5.2 % — SIGNIFICANT CHANGE UP (ref 2–14)
MONOCYTES NFR BLD AUTO: 5.5 % — SIGNIFICANT CHANGE UP (ref 2–14)
NEUTROPHILS # BLD AUTO: 18.82 K/UL — HIGH (ref 1.8–7.4)
NEUTROPHILS # BLD AUTO: 8.32 K/UL — HIGH (ref 1.8–7.4)
NEUTROPHILS NFR BLD AUTO: 77.1 % — HIGH (ref 43–77)
NEUTROPHILS NFR BLD AUTO: 91 % — HIGH (ref 43–77)
NRBC # BLD AUTO: 0 K/UL — SIGNIFICANT CHANGE UP (ref 0–0)
NRBC # BLD AUTO: 0 K/UL — SIGNIFICANT CHANGE UP (ref 0–0)
NRBC # FLD: 0 K/UL — SIGNIFICANT CHANGE UP (ref 0–0)
NRBC # FLD: 0 K/UL — SIGNIFICANT CHANGE UP (ref 0–0)
NRBC BLD AUTO-RTO: 0 /100 WBCS — SIGNIFICANT CHANGE UP (ref 0–0)
NRBC BLD AUTO-RTO: 0 /100 WBCS — SIGNIFICANT CHANGE UP (ref 0–0)
PCP SPEC-MCNC: SIGNIFICANT CHANGE UP
PLATELET # BLD AUTO: 226 K/UL — SIGNIFICANT CHANGE UP (ref 150–400)
PLATELET # BLD AUTO: 302 K/UL — SIGNIFICANT CHANGE UP (ref 150–400)
PMV BLD: 10.5 FL — SIGNIFICANT CHANGE UP (ref 7–13)
PMV BLD: 10.7 FL — SIGNIFICANT CHANGE UP (ref 7–13)
POTASSIUM SERPL-MCNC: 4.2 MMOL/L — SIGNIFICANT CHANGE UP (ref 3.5–5.3)
POTASSIUM SERPL-MCNC: 4.9 MMOL/L — SIGNIFICANT CHANGE UP (ref 3.5–5.3)
POTASSIUM SERPL-SCNC: 4.2 MMOL/L — SIGNIFICANT CHANGE UP (ref 3.5–5.3)
POTASSIUM SERPL-SCNC: 4.9 MMOL/L — SIGNIFICANT CHANGE UP (ref 3.5–5.3)
PROT SERPL-MCNC: 6.3 G/DL — LOW (ref 6.4–8.2)
PROT SERPL-MCNC: 8.2 G/DL — SIGNIFICANT CHANGE UP (ref 6.4–8.2)
RBC # BLD: 4.15 M/UL — SIGNIFICANT CHANGE UP (ref 3.8–5.2)
RBC # BLD: 5.26 M/UL — HIGH (ref 3.8–5.2)
RBC # FLD: 13.4 % — SIGNIFICANT CHANGE UP (ref 10.3–14.5)
RBC # FLD: 13.5 % — SIGNIFICANT CHANGE UP (ref 10.3–14.5)
SODIUM SERPL-SCNC: 135 MMOL/L — SIGNIFICANT CHANGE UP (ref 132–145)
SODIUM SERPL-SCNC: 136 MMOL/L — SIGNIFICANT CHANGE UP (ref 132–145)
WBC # BLD: 10.78 K/UL — HIGH (ref 3.8–10.5)
WBC # BLD: 20.72 K/UL — HIGH (ref 3.8–10.5)
WBC # FLD AUTO: 10.78 K/UL — HIGH (ref 3.8–10.5)
WBC # FLD AUTO: 20.72 K/UL — HIGH (ref 3.8–10.5)

## 2025-07-31 PROCEDURE — 76705 ECHO EXAM OF ABDOMEN: CPT | Mod: 26

## 2025-07-31 PROCEDURE — 74177 CT ABD & PELVIS W/CONTRAST: CPT | Mod: 26

## 2025-07-31 PROCEDURE — 99053 MED SERV 10PM-8AM 24 HR FAC: CPT

## 2025-07-31 PROCEDURE — 99223 1ST HOSP IP/OBS HIGH 75: CPT | Mod: 25

## 2025-07-31 RX ORDER — SODIUM CHLORIDE 9 G/1000ML
2000 INJECTION, SOLUTION INTRAVENOUS ONCE
Refills: 0 | Status: COMPLETED | OUTPATIENT
Start: 2025-07-31 | End: 2025-07-31

## 2025-07-31 RX ORDER — SODIUM CHLORIDE 9 G/1000ML
1000 INJECTION, SOLUTION INTRAVENOUS ONCE
Refills: 0 | Status: COMPLETED | OUTPATIENT
Start: 2025-07-31 | End: 2025-07-31

## 2025-07-31 RX ORDER — MECLIZINE HCL 12.5 MG
25 TABLET ORAL ONCE
Refills: 0 | Status: COMPLETED | OUTPATIENT
Start: 2025-07-31 | End: 2025-07-31

## 2025-07-31 RX ORDER — KETOROLAC TROMETHAMINE 30 MG/ML
15 INJECTION, SOLUTION INTRAMUSCULAR; INTRAVENOUS ONCE
Refills: 0 | Status: DISCONTINUED | OUTPATIENT
Start: 2025-07-31 | End: 2025-07-31

## 2025-07-31 RX ORDER — ONDANSETRON HCL/PF 4 MG/2 ML
4 VIAL (ML) INJECTION ONCE
Refills: 0 | Status: COMPLETED | OUTPATIENT
Start: 2025-07-31 | End: 2025-07-31

## 2025-07-31 RX ORDER — METOCLOPRAMIDE HCL 10 MG
10 TABLET ORAL ONCE
Refills: 0 | Status: COMPLETED | OUTPATIENT
Start: 2025-07-31 | End: 2025-07-31